# Patient Record
Sex: MALE | Race: OTHER | Employment: STUDENT | URBAN - METROPOLITAN AREA
[De-identification: names, ages, dates, MRNs, and addresses within clinical notes are randomized per-mention and may not be internally consistent; named-entity substitution may affect disease eponyms.]

---
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
## 2023-01-11 ENCOUNTER — OFFICE VISIT (OUTPATIENT)
Dept: FAMILY MEDICINE CLINIC | Facility: CLINIC | Age: 18
End: 2023-01-11

## 2023-01-11 VITALS
WEIGHT: 130 LBS | HEART RATE: 60 BPM | DIASTOLIC BLOOD PRESSURE: 67 MMHG | SYSTOLIC BLOOD PRESSURE: 121 MMHG | HEIGHT: 66.8 IN | BODY MASS INDEX: 20.4 KG/M2 | TEMPERATURE: 99 F | RESPIRATION RATE: 18 BRPM

## 2023-01-11 DIAGNOSIS — Z00.00 ROUTINE PHYSICAL EXAMINATION: Primary | ICD-10-CM

## 2023-01-11 DIAGNOSIS — M54.2 NECK PAIN: ICD-10-CM

## 2023-01-11 DIAGNOSIS — F34.1 DYSTHYMIA: ICD-10-CM

## 2023-01-11 PROCEDURE — 99385 PREV VISIT NEW AGE 18-39: CPT | Performed by: FAMILY MEDICINE

## 2023-01-11 PROCEDURE — 3074F SYST BP LT 130 MM HG: CPT | Performed by: FAMILY MEDICINE

## 2023-01-11 PROCEDURE — 3078F DIAST BP <80 MM HG: CPT | Performed by: FAMILY MEDICINE

## 2023-01-11 PROCEDURE — 99213 OFFICE O/P EST LOW 20 MIN: CPT | Performed by: FAMILY MEDICINE

## 2023-01-11 PROCEDURE — 3008F BODY MASS INDEX DOCD: CPT | Performed by: FAMILY MEDICINE

## 2023-01-11 NOTE — PROGRESS NOTES
Subjective:   Patient ID: John Carmona is a 21year old male. Patient is here for routine physical exam and to discuss mental health concerns. No acute issues. No significant chronic medical problems. Patient declined testing at this time. Did have some tests done last year fairly recently. . Diet and exercise have been fair. Past medical history, family history, and social history were reviewed. Pt has been feeling a little down. Lacking racquel and pleasure and lack of motivation. Has had this through high school. No sig family hx. Past hx of chlamydia and was treated. Pt also has hx of neck issues and ? Pinched nerve. Had CT done in ER and was told to follow up with PT. History/Other:   Review of Systems   Constitutional: Negative for fever. Respiratory: Negative for cough and shortness of breath. Cardiovascular: Negative for chest pain. Genitourinary: Negative for difficulty urinating and dysuria. Psychiatric/Behavioral: Negative for self-injury and suicidal ideas. Dysphoric mood: feeling some unhappy. No current outpatient medications on file. Allergies:No Known Allergies    Objective:   Physical Exam  Constitutional:       Appearance: Normal appearance. He is well-developed. HENT:      Head: Normocephalic. Right Ear: Tympanic membrane, ear canal and external ear normal.      Left Ear: Tympanic membrane, ear canal and external ear normal.      Nose: Nose normal.      Mouth/Throat:      Mouth: Mucous membranes are moist.      Pharynx: No oropharyngeal exudate or posterior oropharyngeal erythema. Eyes:      Conjunctiva/sclera: Conjunctivae normal.   Cardiovascular:      Rate and Rhythm: Normal rate and regular rhythm. Heart sounds: Normal heart sounds. Pulmonary:      Effort: Pulmonary effort is normal. No respiratory distress. Breath sounds: Normal breath sounds. No wheezing or rales. Abdominal:      General: Abdomen is flat. There is no distension. Palpations: Abdomen is soft. Tenderness: There is no abdominal tenderness. Musculoskeletal:         General: Normal range of motion. Cervical back: Normal range of motion and neck supple. No rigidity or tenderness. Skin:     General: Skin is warm. Neurological:      General: No focal deficit present. Mental Status: He is alert and oriented to person, place, and time. Sensory: No sensory deficit. Deep Tendon Reflexes: Reflexes are normal and symmetric. Psychiatric:         Mood and Affect: Mood normal.         Behavior: Behavior normal.         Assessment & Plan:   Routine physical examination:  - Exam is unremarkable. Blood tests were discussed and will await previous medical records. Encouraged healthy diet and activity. To call if problems or any significant symptoms. Routine follow up. Dysthymia:  - After discussion, will send for couseling for further evaluation and treatment; To call if any significant symptoms. Behavioral health navigator to help coordinate care. Information provided and referral generated. Neck pain:  - After discussion, will send to physiatry for further evaluation and treatment; To call if any significant symptoms. No orders of the defined types were placed in this encounter.       Meds This Visit:  Requested Prescriptions      No prescriptions requested or ordered in this encounter       Imaging & Referrals:  OP REFERRAL TO [facility]  PHYSIATRY - INTERNAL

## 2023-02-07 ENCOUNTER — OFFICE VISIT (OUTPATIENT)
Dept: PHYSICAL MEDICINE AND REHAB | Facility: CLINIC | Age: 18
End: 2023-02-07
Payer: MEDICAID

## 2023-02-07 VITALS
BODY MASS INDEX: 20.4 KG/M2 | DIASTOLIC BLOOD PRESSURE: 90 MMHG | HEIGHT: 66.8 IN | WEIGHT: 130 LBS | HEART RATE: 111 BPM | SYSTOLIC BLOOD PRESSURE: 132 MMHG | OXYGEN SATURATION: 98 %

## 2023-02-07 DIAGNOSIS — M54.2 NECK PAIN: ICD-10-CM

## 2023-02-07 DIAGNOSIS — M79.18 CERVICAL MYOFASCIAL PAIN SYNDROME: Primary | ICD-10-CM

## 2023-02-07 PROCEDURE — 3075F SYST BP GE 130 - 139MM HG: CPT | Performed by: PHYSICAL MEDICINE & REHABILITATION

## 2023-02-07 PROCEDURE — 3080F DIAST BP >= 90 MM HG: CPT | Performed by: PHYSICAL MEDICINE & REHABILITATION

## 2023-02-07 PROCEDURE — 99204 OFFICE O/P NEW MOD 45 MIN: CPT | Performed by: PHYSICAL MEDICINE & REHABILITATION

## 2023-02-07 PROCEDURE — 3008F BODY MASS INDEX DOCD: CPT | Performed by: PHYSICAL MEDICINE & REHABILITATION

## 2023-02-07 RX ORDER — MELOXICAM 15 MG/1
15 TABLET ORAL DAILY
Qty: 30 TABLET | Refills: 0 | Status: SHIPPED | OUTPATIENT
Start: 2023-02-07

## 2023-02-07 RX ORDER — METHYLPREDNISOLONE 4 MG/1
TABLET ORAL
Qty: 1 EACH | Refills: 0 | Status: SHIPPED | OUTPATIENT
Start: 2023-02-07

## 2023-02-07 NOTE — PATIENT INSTRUCTIONS
Start medrol dose pack, for the next 5-6 days, follow package instruction. Once complete, start Meloxicam (1 tab daily) for 2 weeks then as needed. Start physical therapy working on your neck muscles and neck range of motion. Follow up with me in about 6 weeks (or sooner if needed)   At that time if indicated we can discuss obtaining more advanced imaging of the neck if needed as well as escalation of your care in the form of muscle injections.

## 2023-03-07 ENCOUNTER — OFFICE VISIT (OUTPATIENT)
Dept: PHYSICAL THERAPY | Age: 18
End: 2023-03-07
Attending: PHYSICAL MEDICINE & REHABILITATION
Payer: MEDICAID

## 2023-03-07 DIAGNOSIS — M79.18 CERVICAL MYOFASCIAL PAIN SYNDROME: ICD-10-CM

## 2023-03-07 DIAGNOSIS — M54.2 NECK PAIN: ICD-10-CM

## 2023-03-07 PROCEDURE — 97140 MANUAL THERAPY 1/> REGIONS: CPT | Performed by: PHYSICAL THERAPIST

## 2023-03-07 PROCEDURE — 97161 PT EVAL LOW COMPLEX 20 MIN: CPT | Performed by: PHYSICAL THERAPIST

## 2023-03-07 PROCEDURE — 97110 THERAPEUTIC EXERCISES: CPT | Performed by: PHYSICAL THERAPIST

## 2023-03-09 ENCOUNTER — APPOINTMENT (OUTPATIENT)
Dept: PHYSICAL THERAPY | Age: 18
End: 2023-03-09
Attending: PHYSICAL MEDICINE & REHABILITATION
Payer: MEDICAID

## 2023-03-14 ENCOUNTER — APPOINTMENT (OUTPATIENT)
Dept: PHYSICAL THERAPY | Age: 18
End: 2023-03-14
Attending: PHYSICAL MEDICINE & REHABILITATION
Payer: MEDICAID

## 2023-03-17 ENCOUNTER — OFFICE VISIT (OUTPATIENT)
Dept: PHYSICAL THERAPY | Age: 18
End: 2023-03-17
Attending: PHYSICAL MEDICINE & REHABILITATION
Payer: MEDICAID

## 2023-03-17 PROCEDURE — 97110 THERAPEUTIC EXERCISES: CPT | Performed by: PHYSICAL THERAPIST

## 2023-03-17 PROCEDURE — 97140 MANUAL THERAPY 1/> REGIONS: CPT | Performed by: PHYSICAL THERAPIST

## 2023-03-17 NOTE — PROGRESS NOTES
Diagnosis:  Cervical myofascial pain syndrome (M79.18)  Neck pain (M54.2)          Next MD visit: none scheduled  Fall Risk: standard         Precautions: n/a          Medication Changes since last visit?: No      Subjective: Patient complains of neck and R shoulder pain. Pt describes pain level up to 6/10. Objective:  Cervical ROM is WFL into all planes. (+) R shoulder impingement sign  (-) tenderness to deep palpation over R shoulder  R shoulder ROM: flexion 0-160, abduction 0-145, external rotation 0-90, internal   R shoulder strength is grossly 4/5 into all planes. Assessment: Still with limited neck mobility. Reports increased R-sided neck pain with repeated motion. Presents with decreased R shoulder ROM due to pain. Patient and PT goals are in progress. Plan: Continue PT. Patient to follow up with MD due to continued neck pain and now R shoulder pain with loss of motion.      3/17/2023  Visit#: 2/8 [facility]   Thera Ex   x25min  - reviewed HEP  - neck retraction in slight flexion  - neck rotation to B sides  - neck sidebending unable due to pain  - instructed to do neck retraction in slight flexion   Manual PT   x15min  - cervical mobilization into rotation, lateral flexion with distraction  - cervical mobilization into extension                     Charges: EX2, Manual PT1       Total Timed Treatment: 40 min  Total Treatment Time: 40 min

## 2023-03-21 ENCOUNTER — APPOINTMENT (OUTPATIENT)
Dept: PHYSICAL THERAPY | Age: 18
End: 2023-03-21
Attending: PHYSICAL MEDICINE & REHABILITATION
Payer: MEDICAID

## 2023-03-21 ENCOUNTER — OFFICE VISIT (OUTPATIENT)
Dept: PHYSICAL MEDICINE AND REHAB | Facility: CLINIC | Age: 18
End: 2023-03-21
Payer: MEDICAID

## 2023-03-21 VITALS — BODY MASS INDEX: 22 KG/M2 | WEIGHT: 138 LBS | OXYGEN SATURATION: 100 % | HEART RATE: 78 BPM

## 2023-03-21 DIAGNOSIS — M79.18 CERVICAL MYOFASCIAL PAIN SYNDROME: Primary | ICD-10-CM

## 2023-03-21 DIAGNOSIS — M75.81 ROTATOR CUFF TENDINITIS, RIGHT: ICD-10-CM

## 2023-03-21 PROCEDURE — 99213 OFFICE O/P EST LOW 20 MIN: CPT | Performed by: PHYSICAL MEDICINE & REHABILITATION

## 2023-03-21 NOTE — PROGRESS NOTES
RETURN PATIENT VISIT    CHIEF COMPLAINT  Right-sided neck and right shoulder pain    INTERVAL HISTORY  Juliette Braswell is a 21year old who was last seen in clinic on 2/7/2023. Patient was started on anti-inflammatory burst and recommended to participate in physical therapy. Patient endorses significant improvement while on the steroid as well as about 30 to 40% improvement while in physical therapy. He rates his pain 3/10 he denies any radiation of his symptoms into his upper extremities. He denies any progressive weakness or progressive numbness. His pain is mostly located over the right trapezius as well as over the right anterior shoulder complex. His physical therapist was concerned about his right shoulder as a potential etiology of his pain complaints and was sent back to me for evaluation and clearance to continue participating in physical therapy. REVIEW OF SYSTEMS  Review of systems was completed with the patient today as pertinent to today's visit    PHYSICAL EXAMINATION  CONSTITUTIONAL: Well-appearing, in no apparent distress  EYES: No scleral icterus or conjunctival hemorrhage  CARDIOVASCULAR: Skin warm and well-perfused, no peripheral edema  RESPIRATORY: Breathing unlabored without accessory muscle use  PSYCHIATRIC: Alert, cooperative, appropriate mood and affect  SKIN: No lesions or rashes on exposed skin  MUSCULOSKELETAL: Patient has very mild tenderness over the anterior deltoid, he has a positive empty can on the right, positive belly press negative resisted internal and external rotation. Positive tenderness to palpation over the right trapezius middle and upper cervical paraspinals. He has a negative Kwon, negative scarf test, negative Isabella and speeds tests.   NEUROLOGIC: 5/5 strength in bilateral upper extremities sensation grossly intact light touch    REVIEW OF PRIOR X-RAYS/STUDIES  No pertinent imaging to review    IMPRESSION/DIAGNOSIS  Cervical myofascial pain syndrome (primary encounter diagnosis)  Rotator cuff tendinitis, right      TREATMENT/PLAN  Patient may have a contribution to his myofascial neck pain from his rotator cuff on the right side, I do not believe this patient has any intra-articular right shoulder pathology. May have some rotator cuff tendinitis. He will continue in physical therapy working on strengthening of his rotator cuff and stabilization of his right shoulder. If unsuccessful in alleviating the patient's pain complaints he may be a candidate for a right subdeltoid/subacromial corticosteroid injection in the future. He will follow-up with me after therapy. Education was provided regarding the above impression/diagnosis and treatment options/plan were discussed. All questions were answered during today's visit. Patient will contact clinic if any other questions or concerns.     Mauro Riedel DO  Physical Medicine and Rehabilitation / [address] [facility]

## 2023-03-21 NOTE — PATIENT INSTRUCTIONS
Continue in PT, working with new script which includes your right rotator cuff. Follow up with me after PT, if still lingering we can discuss injection into the subdeltoid bursa.

## 2023-03-28 ENCOUNTER — OFFICE VISIT (OUTPATIENT)
Dept: PHYSICAL THERAPY | Age: 18
End: 2023-03-28
Attending: PHYSICAL MEDICINE & REHABILITATION
Payer: MEDICAID

## 2023-03-28 PROCEDURE — 97110 THERAPEUTIC EXERCISES: CPT | Performed by: PHYSICAL THERAPIST

## 2023-03-28 NOTE — PROGRESS NOTES
Diagnosis:  Cervical myofascial pain syndrome (M79.18)  Neck pain (M54.2)          Next MD visit: none scheduled  Fall Risk: standard         Precautions: n/a          Medication Changes since last visit?: No      Subjective: Patient complains of neck and R shoulder pain. States it feels slightly better  Pt describes pain level up to 4/10. Objective:  Cervical ROM is WFL into all planes. (+) R shoulder impingement sign  (-) tenderness to deep palpation over R shoulder  R shoulder ROM: flexion 0-160, abduction 0-145, external rotation 0-90, internal rotation 0-80  R shoulder strength is grossly 4/5 into all planes. Assessment: Still with limited neck mobility. Presents with decreased R shoulder ROM due to pain. R shoulder strength also decreased and currently at 4/5 compared to L UE. Updated goals:    Updated Goals:  1. Patient will be independent with HEP, its progression, and management of residual symptoms. 2. Patient will report abolished peripheral symptoms and centralized neck pain of not more than 1/10 during activity. 3. Patient will verbalize and demonstrate strategies to improve posture during activity. 4. Patient will resume all previous activities including lifting and carrying without any major difficulty. 5. Patient will report R shoulder pain of not more than 1/10 during activities. 6. Increase R shoulder ROM and strength to WNL into all planes to improve UE mobility. 3/28/2023  Visit#: 3/8 [facility] 3/17/2023  Visit#: 2/8 [facility]   Thera Ex   x55min  - supine B shoulder flexion AROM with bora 10 x 2  - supine B shoulder horizontal abduction 10 x 2  - sidelying R shoulder external rotation with 1lb wt 10 x  2  - seated neck retraction, rotation to B sides 10 x 2  - freemotion shoulder rows 10 x 2  - instructed on HEP. Patient issued theraband for home use.  Patient stated no latex allergy, verbalized understanding of latex allergy precautions  - red theraband shoulder internal and external rotations 10 x 2  - B shoulder extension 10 x 2  - R shoulder behind the back stretch 10x  - UBE lvl 1, 3 min fwd / 3min bkw     x25min  - reviewed HEP  - neck retraction in slight flexion  - neck rotation to B sides  - neck sidebending unable due to pain  - instructed to do neck retraction in slight flexion   Manual PT    x15min  - cervical mobilization into rotation, lateral flexion with distraction  - cervical mobilization into extension     Neuromuscular re-education                Charges: EX2, Manual PT1       Total Timed Treatment: 40 min  Total Treatment Time: 40 min    Plan: Continue skilled Physical Therapy 1 -2 x/week or a total of 5 more visits over a 90 day period. Treatment will include: progression of rehab to improve neck and shoulder mobility. Patient/Family/Caregiver was advised of these findings, precautions, and treatment options and has agreed to actively participate in planning and for this course of care. Thank you for your referral. If you have any questions, please contact me at Dept: [phone].     Sincerely,  Electronically signed by therapist: Dimitris Huggins, PT

## 2023-04-05 ENCOUNTER — OFFICE VISIT (OUTPATIENT)
Dept: PHYSICAL THERAPY | Age: 18
End: 2023-04-05
Attending: PHYSICAL MEDICINE & REHABILITATION
Payer: MEDICAID

## 2023-04-05 PROCEDURE — 97110 THERAPEUTIC EXERCISES: CPT | Performed by: PHYSICAL THERAPIST

## 2023-04-05 NOTE — PROGRESS NOTES
Diagnosis:  Cervical myofascial pain syndrome (M79.18)  Neck pain (M54.2)          Next MD visit: none scheduled  Fall Risk: standard         Precautions: n/a          Medication Changes since last visit?: No      Subjective: Patient reports post exercise soreness after last therapy session. States today it feels a little better. States he feels exercises are making him worse. Pt describes pain level up to 3/10. Objective:  Cervical ROM is WFL into all planes. (+) R shoulder impingement sign  (-) tenderness to deep palpation over R shoulder  R shoulder ROM: flexion 0-160, abduction 0-145, external rotation 0-90, internal rotation 0-80  R shoulder strength is grossly 4/5 into all planes. Assessment: Still with limited neck and R UE mobility due to pain. Patient with decreased tolerance to therapeutic exercises and progression of exercises due to pain. Patient and PT goals are in progress. Goals:  1. Patient will be independent with HEP, its progression, and management of residual symptoms. 2. Patient will report abolished peripheral symptoms and centralized neck pain of not more than 1/10 during activity. 3. Patient will verbalize and demonstrate strategies to improve posture during activity. 4. Patient will resume all previous activities including lifting and carrying without any major difficulty. 5. Patient will report R shoulder pain of not more than 1/10 during activities. 6. Increase R shoulder ROM and strength to WNL into all planes to improve UE mobility.      4/5/2023  Visit#: 4/8 [facility] 3/28/2023  Visit#: 3/8 [facility] 3/17/2023  Visit#: 2/8 [facility]   Thera Ex   x40min  - B shoulder extension 10x  - B shoulder behind the back reach 10x  - supine B shoulder flexion AROM with bora 10x  - seated neck retraction  10x  - seated neck rotation 10x  - seated scapular squeeze 10x  - seated shoulder press 10x  - reviewed HEP   x55min  - supine B shoulder flexion AROM with bora 10 x 2  - supine B shoulder horizontal abduction 10 x 2  - sidelying R shoulder external rotation with 1lb wt 10 x  2  - seated neck retraction, rotation to B sides 10 x 2  - freemotion shoulder rows 10 x 2  - instructed on HEP. Patient issued theraband for home use. Patient stated no latex allergy, verbalized understanding of latex allergy precautions  - red theraband shoulder internal and external rotations 10 x 2  - B shoulder extension 10 x 2  - R shoulder behind the back stretch 10x  - UBE lvl 1, 3 min fwd / 3min bkw     x25min  - reviewed HEP  - neck retraction in slight flexion  - neck rotation to B sides  - neck sidebending unable due to pain  - instructed to do neck retraction in slight flexion   Manual PT     x15min  - cervical mobilization into rotation, lateral flexion with distraction  - cervical mobilization into extension     Neuromuscular re-education                  Charges: EX3     Total Timed Treatment: 40 min  Total Treatment Time: 40 min    Plan:Patient states he will call his MD office and say PT is not helping at this time. Will continue home program as tolerated. Patient will follow up and let us know if he will keep his other appointments. Patient/Family/Caregiver was advised of these findings, precautions, and treatment options and has agreed to actively participate in planning and for this course of care. Thank you for your referral. If you have any questions, please contact me at Dept: [phone].     Sincerely,  Electronically signed by therapist: Yecenia Peng., PT

## 2023-04-11 ENCOUNTER — APPOINTMENT (OUTPATIENT)
Dept: PHYSICAL THERAPY | Age: 18
End: 2023-04-11
Attending: PHYSICAL MEDICINE & REHABILITATION
Payer: MEDICAID

## 2023-04-18 ENCOUNTER — APPOINTMENT (OUTPATIENT)
Dept: PHYSICAL THERAPY | Age: 18
End: 2023-04-18
Attending: PHYSICAL MEDICINE & REHABILITATION
Payer: MEDICAID

## 2023-04-25 ENCOUNTER — APPOINTMENT (OUTPATIENT)
Dept: PHYSICAL THERAPY | Age: 18
End: 2023-04-25
Attending: PHYSICAL MEDICINE & REHABILITATION
Payer: MEDICAID

## 2023-05-18 ENCOUNTER — HOSPITAL ENCOUNTER (OUTPATIENT)
Age: 18
Discharge: HOME | End: 2023-05-18
Payer: MEDICAID

## 2023-05-18 VITALS
TEMPERATURE: 98 F | HEIGHT: 68 IN | SYSTOLIC BLOOD PRESSURE: 130 MMHG | WEIGHT: 135 LBS | HEART RATE: 56 BPM | DIASTOLIC BLOOD PRESSURE: 77 MMHG | OXYGEN SATURATION: 99 % | RESPIRATION RATE: 20 BRPM | BODY MASS INDEX: 20.46 KG/M2

## 2023-05-18 DIAGNOSIS — J02.9 ACUTE VIRAL PHARYNGITIS: Primary | ICD-10-CM

## 2023-05-18 DIAGNOSIS — Z20.822 ENCOUNTER FOR LABORATORY TESTING FOR COVID-19 VIRUS: ICD-10-CM

## 2023-05-18 LAB
S PYO AG THROAT QL: NEGATIVE
SARS-COV-2 RNA RESP QL NAA+PROBE: NOT DETECTED

## 2023-07-18 ENCOUNTER — OFFICE VISIT (OUTPATIENT)
Dept: FAMILY MEDICINE CLINIC | Facility: CLINIC | Age: 18
End: 2023-07-18

## 2023-07-18 VITALS
HEART RATE: 66 BPM | DIASTOLIC BLOOD PRESSURE: 60 MMHG | HEIGHT: 66.6 IN | BODY MASS INDEX: 23.67 KG/M2 | TEMPERATURE: 98 F | RESPIRATION RATE: 18 BRPM | SYSTOLIC BLOOD PRESSURE: 100 MMHG | WEIGHT: 149 LBS

## 2023-07-18 DIAGNOSIS — Z86.19 HISTORY OF ONYCHOMYCOSIS: ICD-10-CM

## 2023-07-18 DIAGNOSIS — L70.9 ACNE, UNSPECIFIED ACNE TYPE: ICD-10-CM

## 2023-07-18 DIAGNOSIS — M54.2 NECK PAIN: ICD-10-CM

## 2023-07-18 DIAGNOSIS — Z00.00 ROUTINE PHYSICAL EXAMINATION: Primary | ICD-10-CM

## 2023-07-18 PROCEDURE — 99214 OFFICE O/P EST MOD 30 MIN: CPT | Performed by: FAMILY MEDICINE

## 2023-07-18 PROCEDURE — 3008F BODY MASS INDEX DOCD: CPT | Performed by: FAMILY MEDICINE

## 2023-07-18 PROCEDURE — 3078F DIAST BP <80 MM HG: CPT | Performed by: FAMILY MEDICINE

## 2023-07-18 PROCEDURE — 3074F SYST BP LT 130 MM HG: CPT | Performed by: FAMILY MEDICINE

## 2023-07-18 NOTE — PROGRESS NOTES
Subjective:   Patient ID: Bridget Gilmore is a 21year old male. Pt presents with hx of some ? Toenail fungus. Has not tried anything for this yet. Pt also requesting referrals for physiatry for his neck for follow up. Pt has been doing PT and was getting better but then pain recurred. Also requesting routine blood work and referral for dermatologist for his acne. Diet has been good. History/Other:   Review of Systems   Constitutional:  Negative for fever. Musculoskeletal:  Positive for neck pain. Skin:  Color change: hx of acne. Current Outpatient Medications   Medication Sig Dispense Refill    methylPREDNISolone (MEDROL) 4 MG Oral Tablet Therapy Pack Take as directed (Patient not taking: Reported on 3/21/2023) 1 each 0    Meloxicam 15 MG Oral Tab Take 1 tablet (15 mg total) by mouth daily. (Patient not taking: Reported on 3/21/2023) 30 tablet 0     Allergies:No Known Allergies    Objective:   Physical Exam  Constitutional:       Appearance: Normal appearance. Cardiovascular:      Rate and Rhythm: Normal rate. Pulses: Normal pulses. Heart sounds: Normal heart sounds. Pulmonary:      Effort: Pulmonary effort is normal.      Breath sounds: Normal breath sounds. Musculoskeletal:      Comments: Some yellowing of tips of nails of both feet. Neurological:      Mental Status: He is alert. Psychiatric:         Mood and Affect: Mood normal.         Behavior: Behavior normal.         Assessment & Plan:   Routine physical examination:  - After discussion with patient, will check routine blood work as requested; To call if any significant symptoms. Follow up and further management after testing. Neck pain:  - After discussion, will send to physiatry for further evaluation and treatment; To call if any significant symptoms. History of onychomycosis/ Acne, unspecified acne type  - Discussed otc remedies for toenails.  After discussion, will send to dermatology for further evaluation and treatment for acne To call if any significant symptoms. Orders Placed This Encounter      Comp Metabolic Panel (14)      Lipid Panel      CBC, Platelet;  No Differential      Meds This Visit:  Requested Prescriptions      No prescriptions requested or ordered in this encounter       Imaging & Referrals:  [facility] - INTERNAL

## 2023-08-01 ENCOUNTER — OFFICE VISIT (OUTPATIENT)
Dept: DERMATOLOGY CLINIC | Facility: CLINIC | Age: 18
End: 2023-08-01

## 2023-08-01 DIAGNOSIS — L70.0 ACNE VULGARIS: Primary | ICD-10-CM

## 2023-08-01 PROCEDURE — 99203 OFFICE O/P NEW LOW 30 MIN: CPT | Performed by: PHYSICIAN ASSISTANT

## 2023-08-01 RX ORDER — TRETINOIN 0.5 MG/G
CREAM TOPICAL
Qty: 20 G | Refills: 5 | Status: SHIPPED | OUTPATIENT
Start: 2023-08-01

## 2023-08-01 RX ORDER — CLINDAMYCIN PHOSPHATE 10 UG/ML
1 LOTION TOPICAL DAILY
Qty: 60 ML | Refills: 3 | Status: SHIPPED | OUTPATIENT
Start: 2023-08-01

## 2023-08-01 NOTE — PATIENT INSTRUCTIONS
Morning Routine:    Cleanser  Clindamycin  Moisturizer  Sun screen    Evening Routine:    Cleanser  Moisturizer  Retinoid --> use a pea sized dot for the entire face 2-3 times per week. Can move slowly up to every night.    Moisturize

## 2023-08-01 NOTE — PROGRESS NOTES
HPI:    Patient ID: Olga Kelley is a 21year old male. Patient presents with acne on face and back for the past 5 years. Using OTC without relief. Patient denies personal or family hx of skin cancer. No allergies to medications noted. Sometimes will get pimples on his chest and back. No draining or tenderness noted. Review of Systems   Constitutional:  Negative for chills and fever. Musculoskeletal:  Negative for arthralgias and myalgias. Skin:  Positive for rash. Negative for color change and wound. Current Outpatient Medications   Medication Sig Dispense Refill    clindamycin 1 % External Lotion Apply 1 Application topically daily. 60 mL 3    Tretinoin 0.05 % External Cream Apply to face nightly as tolerated 20 g 5    methylPREDNISolone (MEDROL) 4 MG Oral Tablet Therapy Pack Take as directed 1 each 0    Meloxicam 15 MG Oral Tab Take 1 tablet (15 mg total) by mouth daily. 30 tablet 0     Allergies:No Known Allergies   There were no vitals taken for this visit. There is no height or weight on file to calculate BMI. PHYSICAL EXAM:   Physical Exam  Constitutional:       General: He is not in acute distress. Appearance: Normal appearance. Skin:     General: Skin is warm and dry. Findings: Rash present. Comments: Papules noted on the nose, cheeks and forehead. No draining or tenderness noted. No pustules noted. No cystic lesions noted. Neurological:      Mental Status: He is alert and oriented to person, place, and time. ASSESSMENT/PLAN:   1. Acne vulgaris  -After discussion with patient, advised the following:  -Started clindamycin   -Educated to apply daily.   -Started tretinoin as well  -Educated to apply 2-3 times per week at night only  -Can titrate up to nightly if not redness, irritation or dryness noted  -Shoulder moisturize daily  -Should cleanse daily and after sports.   -To return in 3 months for follow-up to monitor progress and toleration. -To call or follow-up with worsening symptoms or concerns.   -Pt was agreeable to plan and will comply with discussion above. No orders of the defined types were placed in this encounter. Meds This Visit:  Requested Prescriptions     Signed Prescriptions Disp Refills    clindamycin 1 % External Lotion 60 mL 3     Sig: Apply 1 Application topically daily.     Tretinoin 0.05 % External Cream 20 g 5     Sig: Apply to face nightly as tolerated       Imaging & Referrals:  None         OF#[id number]

## 2023-09-05 ENCOUNTER — OFFICE VISIT (OUTPATIENT)
Dept: PHYSICAL MEDICINE AND REHAB | Facility: CLINIC | Age: 18
End: 2023-09-05
Payer: MEDICAID

## 2023-09-05 VITALS — WEIGHT: 140 LBS | HEIGHT: 67 IN | BODY MASS INDEX: 21.97 KG/M2

## 2023-09-05 DIAGNOSIS — G89.29 CHRONIC RIGHT SHOULDER PAIN: Primary | ICD-10-CM

## 2023-09-05 DIAGNOSIS — S16.1XXD CERVICAL MYOFASCIAL STRAIN, SUBSEQUENT ENCOUNTER: ICD-10-CM

## 2023-09-05 DIAGNOSIS — M25.511 CHRONIC RIGHT SHOULDER PAIN: Primary | ICD-10-CM

## 2023-09-05 PROCEDURE — 3008F BODY MASS INDEX DOCD: CPT | Performed by: PHYSICAL MEDICINE & REHABILITATION

## 2023-09-05 PROCEDURE — 99214 OFFICE O/P EST MOD 30 MIN: CPT | Performed by: PHYSICAL MEDICINE & REHABILITATION

## 2023-09-05 RX ORDER — CYCLOBENZAPRINE HCL 10 MG
10 TABLET ORAL NIGHTLY
Qty: 30 TABLET | Refills: 0 | Status: SHIPPED | OUTPATIENT
Start: 2023-09-05

## 2023-09-05 RX ORDER — MELOXICAM 15 MG/1
15 TABLET ORAL DAILY
Qty: 30 TABLET | Refills: 0 | Status: SHIPPED | OUTPATIENT
Start: 2023-09-05

## 2023-10-13 RX ORDER — CLINDAMYCIN PHOSPHATE 10 UG/ML
1 LOTION TOPICAL DAILY
Qty: 60 ML | Refills: 3 | OUTPATIENT
Start: 2023-10-13

## 2023-12-23 ENCOUNTER — APPOINTMENT (OUTPATIENT)
Dept: CT IMAGING | Facility: HOSPITAL | Age: 18
End: 2023-12-23
Attending: EMERGENCY MEDICINE
Payer: MEDICAID

## 2023-12-23 PROCEDURE — 70450 CT HEAD/BRAIN W/O DYE: CPT | Performed by: EMERGENCY MEDICINE

## 2023-12-24 PROBLEM — F29 PSYCHOSIS (HCC): Status: ACTIVE | Noted: 2023-12-24

## 2024-02-08 ENCOUNTER — OFFICE VISIT (OUTPATIENT)
Dept: FAMILY MEDICINE CLINIC | Facility: CLINIC | Age: 19
End: 2024-02-08

## 2024-02-08 VITALS
SYSTOLIC BLOOD PRESSURE: 119 MMHG | HEART RATE: 70 BPM | BODY MASS INDEX: 22.87 KG/M2 | DIASTOLIC BLOOD PRESSURE: 69 MMHG | WEIGHT: 144 LBS | TEMPERATURE: 98 F | HEIGHT: 66.4 IN | RESPIRATION RATE: 18 BRPM

## 2024-02-08 DIAGNOSIS — Z00.00 ROUTINE PHYSICAL EXAMINATION: Primary | ICD-10-CM

## 2024-02-08 DIAGNOSIS — L70.9 ACNE, UNSPECIFIED ACNE TYPE: ICD-10-CM

## 2024-02-08 DIAGNOSIS — M54.2 NECK AND SHOULDER PAIN: ICD-10-CM

## 2024-02-08 DIAGNOSIS — M25.519 NECK AND SHOULDER PAIN: ICD-10-CM

## 2024-02-08 PROCEDURE — 99213 OFFICE O/P EST LOW 20 MIN: CPT | Performed by: FAMILY MEDICINE

## 2024-02-08 PROCEDURE — 99395 PREV VISIT EST AGE 18-39: CPT | Performed by: FAMILY MEDICINE

## 2024-02-08 RX ORDER — OLANZAPINE 5 MG/1
5 TABLET ORAL NIGHTLY
COMMUNITY
Start: 2024-01-11

## 2024-02-08 NOTE — PROGRESS NOTES
Subjective:   Patient ID: Aba Robles is a 18 year old male.    Patient is here for routine physical exam. No acute issues. No significant chronic medical problems. Patient is requesting testing. Diet and exercise have been fair. Not much exercise now.  Past medical history, family history, and social history were reviewed. Uncle had diabetes.    Past hx of mental health issues and is seeing a psychiatrist. Stable on medication.  Pt also has acne and requesting referral for dermatology.   Has had recurring issues with right neck and shoulder area. Was seeing Dr Kendrick and doing PT but still sig symptoms and needs referral for follow up.        History/Other:   Review of Systems   Constitutional: Negative.  Negative for fever.   HENT: Negative.     Eyes: Negative.    Respiratory: Negative.  Negative for cough and shortness of breath.    Cardiovascular: Negative.  Negative for chest pain.   Gastrointestinal: Negative.  Negative for abdominal pain.   Endocrine: Negative.    Genitourinary: Negative.  Negative for dysuria.   Musculoskeletal: Negative.    Skin: Negative.    Allergic/Immunologic: Negative.    Neurological: Negative.  Negative for dizziness and headaches.   Psychiatric/Behavioral: Negative.  Negative for dysphoric mood. The patient is not nervous/anxious.      Current Outpatient Medications   Medication Sig Dispense Refill    OLANZapine 5 MG Oral Tab Take 1 tablet (5 mg total) by mouth nightly.       Allergies:No Known Allergies    Objective:   Physical Exam  Constitutional:       Appearance: Normal appearance. He is well-developed.   HENT:      Head: Normocephalic.      Right Ear: Tympanic membrane, ear canal and external ear normal.      Left Ear: Tympanic membrane, ear canal and external ear normal.      Nose: Nose normal.      Mouth/Throat:      Mouth: Mucous membranes are moist.      Pharynx: No oropharyngeal exudate or posterior oropharyngeal erythema.   Eyes:      Conjunctiva/sclera: Conjunctivae  normal.   Cardiovascular:      Rate and Rhythm: Normal rate and regular rhythm.      Pulses: Normal pulses.      Heart sounds: Normal heart sounds.   Pulmonary:      Effort: Pulmonary effort is normal. No respiratory distress.      Breath sounds: Normal breath sounds. No wheezing or rales.   Abdominal:      General: Abdomen is flat. There is no distension.      Palpations: Abdomen is soft. There is no mass.      Tenderness: There is no abdominal tenderness.   Musculoskeletal:         General: Normal range of motion.      Cervical back: Normal range of motion and neck supple.      Comments: Discomfort over the posterior neck and superior shoulder of right side.   Skin:     General: Skin is warm.      Comments: Acne of face area   Neurological:      General: No focal deficit present.      Mental Status: He is alert and oriented to person, place, and time.      Sensory: No sensory deficit.      Deep Tendon Reflexes: Reflexes are normal and symmetric. Reflexes normal.   Psychiatric:         Mood and Affect: Mood normal.         Behavior: Behavior normal.         Assessment & Plan:   1. Routine physical examination:  - Exam is unremarkable. Screening tests were discussed, and after discussion, will check lab work as below. Healthy diet, exercise, and weight were discussed. To call if problems and follow up and further management after testing. Routine follow up.     2. Neck and shoulder pain:  - After discussion, will send to physiatry for further evaluation and treatment; To call if any significant symptoms.      3. Acne, unspecified acne type:  - After discussion, will send to dermatology for further evaluation and treatment; To call if any significant symptoms.          Orders Placed This Encounter   Procedures    Comp Metabolic Panel (14)    CBC, Platelet; No Differential    Lipid Panel       Meds This Visit:  Requested Prescriptions      No prescriptions requested or ordered in this encounter       Imaging &  Referrals:  PHYSIATRY - INTERNAL  DERM - INTERNAL

## 2024-04-01 ENCOUNTER — HOSPITAL ENCOUNTER (OUTPATIENT)
Dept: GENERAL RADIOLOGY | Age: 19
Discharge: HOME | End: 2024-04-01
Attending: PHYSICAL MEDICINE & REHABILITATION
Payer: MEDICAID

## 2024-04-01 DIAGNOSIS — G89.29 CHRONIC RIGHT SHOULDER PAIN: ICD-10-CM

## 2024-04-01 DIAGNOSIS — S16.1XXD CERVICAL MYOFASCIAL STRAIN, SUBSEQUENT ENCOUNTER: ICD-10-CM

## 2024-04-01 DIAGNOSIS — M25.511 CHRONIC RIGHT SHOULDER PAIN: ICD-10-CM

## 2024-04-01 PROCEDURE — 73030 X-RAY EXAM OF SHOULDER: CPT | Performed by: PHYSICAL MEDICINE & REHABILITATION

## 2024-04-02 ENCOUNTER — HOSPITAL ENCOUNTER (OUTPATIENT)
Dept: GENERAL RADIOLOGY | Age: 19
Discharge: HOME | End: 2024-04-02
Attending: PHYSICAL MEDICINE & REHABILITATION
Payer: MEDICAID

## 2024-04-02 DIAGNOSIS — S16.1XXD CERVICAL MYOFASCIAL STRAIN, SUBSEQUENT ENCOUNTER: ICD-10-CM

## 2024-04-02 DIAGNOSIS — G89.29 CHRONIC RIGHT SHOULDER PAIN: ICD-10-CM

## 2024-04-02 DIAGNOSIS — M25.511 CHRONIC RIGHT SHOULDER PAIN: ICD-10-CM

## 2024-04-02 PROCEDURE — 72050 X-RAY EXAM NECK SPINE 4/5VWS: CPT | Performed by: PHYSICAL MEDICINE & REHABILITATION

## 2024-04-25 ENCOUNTER — HOSPITAL ENCOUNTER (OUTPATIENT)
Age: 19
Discharge: HOME | End: 2024-04-25
Payer: MEDICAID

## 2024-04-25 VITALS
HEART RATE: 90 BPM | RESPIRATION RATE: 22 BRPM | TEMPERATURE: 98 F | BODY MASS INDEX: 25.11 KG/M2 | HEIGHT: 67 IN | DIASTOLIC BLOOD PRESSURE: 53 MMHG | OXYGEN SATURATION: 99 % | SYSTOLIC BLOOD PRESSURE: 143 MMHG | WEIGHT: 160 LBS

## 2024-04-25 DIAGNOSIS — K64.9 HEMORRHOIDS, UNSPECIFIED HEMORRHOID TYPE: Primary | ICD-10-CM

## 2024-04-25 PROCEDURE — 99213 OFFICE O/P EST LOW 20 MIN: CPT | Performed by: PHYSICIAN ASSISTANT

## 2024-04-25 RX ORDER — HYDROCORTISONE 25 MG/G
1 CREAM TOPICAL 2 TIMES DAILY
Qty: 28 G | Refills: 0 | Status: SHIPPED | OUTPATIENT
Start: 2024-04-25 | End: 2024-05-05

## 2024-04-25 NOTE — DISCHARGE INSTRUCTIONS
If your symptoms do not improve after using hydrocortisone cream, you should be evaluated by your primary care physician    If you develop abdominal pain, persistent vomiting or fever- be seen in the ER

## 2024-04-25 NOTE — ED PROVIDER NOTES
Patient Seen in: [facility]      History     Chief Complaint   Patient presents with    Rectal Bleeding     Stated Complaint: Abdominal Pain    Subjective:   HPI    19 yo male presenting with c/o rectal bleeding for the last 2-3 days.  Patient reports noting bright red blood with bowel movements.  There is minimal associated pain.  He denies constipation or straining.  He has not had hemorrhoids previously.  Denies abdominal pain, urinary symptoms.    Objective:   History reviewed. No pertinent past medical history.           History reviewed. No pertinent surgical history.             Social History     Socioeconomic History    Marital status: Single   Tobacco Use    Smoking status: Never     Passive exposure: Never   Substance and Sexual Activity    Alcohol use: Never    Drug use: Yes     Types: Cannabis   Other Topics Concern    Caffeine Concern No    Exercise No    Grew up on a farm No    History of tanning No    Outdoor occupation No    Reaction to local anesthetic No    Pt has a pacemaker No    Pt has a defibrillator No   Social History Narrative    The patient does not use an assistive device..      The patient does live in a home with stairs.              Review of Systems    Positive for stated complaint: Abdominal Pain  Other systems are as noted in HPI.  Constitutional and vital signs reviewed.      All other systems reviewed and negative except as noted above.    Physical Exam     ED Triage Vitals [04/25/24 1626]   /53   Pulse 90   Resp 22   Temp 98.3 °F (36.8 °C)   Temp src Temporal   SpO2 99 %   O2 Device None (Room air)       Current:/53   Pulse 90   Temp 98.3 °F (36.8 °C) (Temporal)   Resp 22   Ht 170.2 cm (5' 7\")   Wt 72.6 kg   SpO2 99%   BMI 25.06 kg/m²         Physical Exam  Vitals and nursing note reviewed.   Constitutional:       General: He is not in acute distress.  HENT:      Head: Normocephalic and atraumatic.      Right Ear: External ear normal.      Left  Ear: External ear normal.      Nose: Nose normal.      Mouth/Throat:      Mouth: Mucous membranes are moist.   Eyes:      Extraocular Movements: Extraocular movements intact.      Pupils: Pupils are equal, round, and reactive to light.   Cardiovascular:      Rate and Rhythm: Normal rate.   Pulmonary:      Effort: Pulmonary effort is normal.   Abdominal:      General: Abdomen is flat. There is no distension.      Tenderness: There is no abdominal tenderness.   Genitourinary:     Rectum: Internal hemorrhoid present. Normal anal tone.   Musculoskeletal:         General: Normal range of motion.      Cervical back: Normal range of motion.   Skin:     General: Skin is warm.   Neurological:      General: No focal deficit present.      Mental Status: He is alert and oriented to person, place, and time.   Psychiatric:         Mood and Affect: Mood normal.         Behavior: Behavior normal.               ED Course   Labs Reviewed - No data to display     18-year-old male presenting for evaluation of bright red blood per rectum for the last 2 to 3 days.  Patient noticing this only with bowel movements.  He is hemodynamically stable.  There is no abdominal distention or tenderness on exam.  Small internal hemorrhoid noted on ERLIN    Ddx-external hemorrhoid, anal fissure, internal hemorrhoid    Hydrocortisone Rx'd to pharmacy.  Discussed increasing water, fiber and OTC supplements to improve constipation.  PCP follow-up if the symptoms do not improve              MDM                                         Medical Decision Making      Disposition and Plan     Clinical Impression:  1. Hemorrhoids, unspecified hemorrhoid type         Disposition:  Discharge  4/25/2024  5:37 pm    Follow-up:  No follow-up provider specified.        Medications Prescribed:  Discharge Medication List as of 4/25/2024  5:38 PM        START taking these medications    Details   hydrocortisone 2.5 % External Cream Place 1 Application rectally 2 (two)  times daily for 10 days., Normal, Disp-28 g, R-0

## 2024-04-25 NOTE — ED INITIAL ASSESSMENT (HPI)
Pt presents to the IC with c/o rectal bleed with pattie blood for the last 2-3 days, coupled with midline abd pain. Pt denies hx of hemorrhoids. No fevers. No n/v.

## 2024-05-07 ENCOUNTER — OFFICE VISIT (OUTPATIENT)
Dept: PHYSICAL MEDICINE AND REHAB | Facility: CLINIC | Age: 19
End: 2024-05-07
Payer: MEDICAID

## 2024-05-07 VITALS — RESPIRATION RATE: 18 BRPM | BODY MASS INDEX: 25.11 KG/M2 | WEIGHT: 160 LBS | HEIGHT: 67 IN

## 2024-05-07 DIAGNOSIS — M25.511 CHRONIC RIGHT SHOULDER PAIN: Primary | ICD-10-CM

## 2024-05-07 DIAGNOSIS — G89.29 CHRONIC RIGHT SHOULDER PAIN: Primary | ICD-10-CM

## 2024-05-07 PROCEDURE — 99214 OFFICE O/P EST MOD 30 MIN: CPT | Performed by: PHYSICAL MEDICINE & REHABILITATION

## 2024-05-07 NOTE — PROGRESS NOTES
RETURN PATIENT VISIT    CHIEF COMPLAINT  Neck pain    INTERVAL HISTORY  Aba Robles is a 18 year old who was last seen in clinic on 9/5/2023, in follow-up for neck and shoulder pain on the right side.  Patient completed an x-ray recently which is reviewed in full below.  Pain is located primarily in the right side of the neck to the top of the right shoulder.  Denies radiation of symptoms or numbness and tingling.  States his pain varies in intensity depending on his activities.  He has mild pain with range of motion.  Denies current use of pain medications or current physical therapy.  He completed physical therapy previously.    Additionally he states that he previously had an MRI of his right shoulder a number of years ago at [facility] which she plans to obtain for my review.    REVIEW OF SYSTEMS  Review of systems was completed with the patient today as pertinent to today's visit    PHYSICAL EXAMINATION  CONSTITUTIONAL: Well-appearing, in no apparent distress  EYES: No scleral icterus or conjunctival hemorrhage  CARDIOVASCULAR: Skin warm and well-perfused, no peripheral edema  RESPIRATORY: Breathing unlabored without accessory muscle use  PSYCHIATRIC: Alert, cooperative, appropriate mood and affect  SKIN: No lesions or rashes on exposed skin  MUSCULOSKELETAL: Patient has tenderness palpation of the musculature in the posterior shoulder as well as through the infraspinatus and its tendon.  He has pain with abduction as well as Kwon maneuver on the right side compared to the left.  Full passive range of motion however.  NEUROLOGIC: Well-maintained strength and sensation in the upper extremities.    REVIEW OF PRIOR X-RAYS/STUDIES  Independently reviewed the plain from radiographs of the cervical spine dated 4/3/2024 which reveals unremarkable osseous structures, slightly limited range of motion in flexion and extension without abnormal subluxation.    Additionally reviewed the plain from radiographs  of the right shoulder dated 4/1/2024 which reveals normal right shoulder osseous structures.  No soft tissue swelling or abnormal alignment.    IMPRESSION/DIAGNOSIS  1.    Encounter Diagnosis   Name Primary?    Chronic right shoulder pain Yes         TREATMENT/PLAN  Patient has failed physical therapy, prescription anti-inflammatory medications and home exercise program.  My suspicion is that his pain complaints are largely muscular as his radiographs have been unrevealing for osseous abnormality.  I would like to order an ultrasound of the right shoulder to check for any soft tissue abnormalities leading to his pain complaints, if unrevealing patient will likely need gentle return to gym exercises, rotator cuff strengthening and a solid home routine.  He will follow-up with me after ultrasound is completed.    Education was provided regarding the above impression/diagnosis and treatment options/plan were discussed.  All questions were answered during today's visit.  Patient will contact clinic if any other questions or concerns.          Stephane Kendrick DO  Interventional Spine and Sports Medicine Specialist   Physical Medicine and Rehabilitation  [address] [address]    [address] [place] [address]

## 2024-07-09 PROCEDURE — 12011 RPR F/E/E/N/L/M 2.5 CM/<: CPT

## 2024-07-09 PROCEDURE — 99284 EMERGENCY DEPT VISIT MOD MDM: CPT

## 2024-07-10 ENCOUNTER — HOSPITAL ENCOUNTER (EMERGENCY)
Facility: HOSPITAL | Age: 19
Discharge: HOME | End: 2024-07-10
Attending: EMERGENCY MEDICINE
Payer: MEDICAID

## 2024-07-10 ENCOUNTER — APPOINTMENT (OUTPATIENT)
Dept: CT IMAGING | Facility: HOSPITAL | Age: 19
End: 2024-07-10
Attending: EMERGENCY MEDICINE
Payer: MEDICAID

## 2024-07-10 VITALS
HEART RATE: 90 BPM | SYSTOLIC BLOOD PRESSURE: 122 MMHG | HEIGHT: 67 IN | OXYGEN SATURATION: 100 % | TEMPERATURE: 99 F | DIASTOLIC BLOOD PRESSURE: 80 MMHG | BODY MASS INDEX: 24.33 KG/M2 | RESPIRATION RATE: 18 BRPM | WEIGHT: 155 LBS

## 2024-07-10 DIAGNOSIS — S01.111A LACERATION OF RIGHT EYEBROW, INITIAL ENCOUNTER: Primary | ICD-10-CM

## 2024-07-10 DIAGNOSIS — S09.90XA INJURY OF HEAD, INITIAL ENCOUNTER: ICD-10-CM
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
## 2024-07-10 PROCEDURE — 70450 CT HEAD/BRAIN W/O DYE: CPT | Performed by: EMERGENCY MEDICINE

## 2024-07-10 RX ORDER — KETOROLAC TROMETHAMINE 10 MG/1
10 TABLET, FILM COATED ORAL EVERY 6 HOURS PRN
Qty: 20 TABLET | Refills: 0 | Status: SHIPPED | OUTPATIENT
Start: 2024-07-10 | End: 2024-07-15

## 2024-07-10 RX ORDER — LIDOCAINE HYDROCHLORIDE 10 MG/ML
20 INJECTION, SOLUTION EPIDURAL; INFILTRATION; INTRACAUDAL; PERINEURAL ONCE
Status: COMPLETED | OUTPATIENT
Start: 2024-07-10 | End: 2024-07-10

## 2024-07-10 RX ORDER — HYDROCODONE BITARTRATE AND ACETAMINOPHEN 5; 325 MG/1; MG/1
2 TABLET ORAL ONCE
Status: COMPLETED | OUTPATIENT
Start: 2024-07-10 | End: 2024-07-10
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
## 2024-07-10 NOTE — DISCHARGE INSTRUCTIONS
Keep wound clean with soapy water twice daily and apply antibiotic ointment such as Neosporin 3 times daily.  Follow-up in 7 days for suture removal.
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
## 2024-07-17 ENCOUNTER — HOSPITAL ENCOUNTER (OUTPATIENT)
Age: 19
Discharge: HOME | End: 2024-07-17
Payer: MEDICAID

## 2024-07-17 VITALS
SYSTOLIC BLOOD PRESSURE: 123 MMHG | TEMPERATURE: 99 F | RESPIRATION RATE: 16 BRPM | OXYGEN SATURATION: 98 % | HEART RATE: 59 BPM | DIASTOLIC BLOOD PRESSURE: 69 MMHG

## 2024-07-17 DIAGNOSIS — Z48.02 ENCOUNTER FOR REMOVAL OF SUTURES: Primary | ICD-10-CM

## 2024-07-17 PROCEDURE — 90715 TDAP VACCINE 7 YRS/> IM: CPT | Performed by: NURSE PRACTITIONER

## 2024-07-17 PROCEDURE — 99024 POSTOP FOLLOW-UP VISIT: CPT | Performed by: NURSE PRACTITIONER

## 2024-07-17 PROCEDURE — 90471 IMMUNIZATION ADMIN: CPT | Performed by: NURSE PRACTITIONER
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
## 2024-07-17 NOTE — DISCHARGE INSTRUCTIONS
Wound is healing well.  Continue to clean with soap and water.  He may continue the topical antibiotic ointment.  The bruising around your eye and the redness to your eye should continue to improve.  Your tetanus was updated today.  Schedule follow-up with your primary doctor as needed

## 2024-08-12 ENCOUNTER — OFFICE VISIT (OUTPATIENT)
Dept: DERMATOLOGY CLINIC | Facility: CLINIC | Age: 19
End: 2024-08-12
Payer: MEDICAID

## 2024-08-12 DIAGNOSIS — L70.0 ACNE VULGARIS: Primary | ICD-10-CM

## 2024-08-12 PROCEDURE — 99213 OFFICE O/P EST LOW 20 MIN: CPT | Performed by: PHYSICIAN ASSISTANT

## 2024-08-12 RX ORDER — TRETINOIN 0.5 MG/G
CREAM TOPICAL
Qty: 20 G | Refills: 5 | Status: SHIPPED | OUTPATIENT
Start: 2024-08-12

## 2024-08-12 RX ORDER — CLINDAMYCIN PHOSPHATE 10 UG/ML
1 LOTION TOPICAL DAILY
Qty: 60 ML | Refills: 3 | Status: SHIPPED | OUTPATIENT
Start: 2024-08-12

## 2024-08-12 NOTE — PROGRESS NOTES
HPI:    Patient ID: Aba Robles is a 18 year old male.    Patient presents for a follow up to acne on the face and body. Patient used clindamycin lotion and tretinoin x several months with improvement but has been off of the treatments bc he ran out of refills. Patient would like to discuss accutane. No draining or tenderness noted. No allergies to medications noted.         Review of Systems   Constitutional:  Negative for chills and fever.   Musculoskeletal:  Negative for arthralgias and myalgias.   Skin:  Positive for rash. Negative for color change and wound.            Current Outpatient Medications   Medication Sig Dispense Refill    OLANZapine 5 MG Oral Tab Take 1 tablet (5 mg total) by mouth nightly. (Patient not taking: Reported on 8/12/2024)       Allergies:No Known Allergies   There were no vitals taken for this visit.  There is no height or weight on file to calculate BMI.  PHYSICAL EXAM:   Physical Exam  Constitutional:       General: He is not in acute distress.     Appearance: Normal appearance.   Skin:     General: Skin is warm and dry.      Findings: Rash present.      Comments: Papular lesions noted on the face and back. No pustules noted. Smaller cystic lesions note don the face. Some scarring as well.    Neurological:      Mental Status: He is alert and oriented to person, place, and time.                ASSESSMENT/PLAN:   1. Acne vulgaris  -After discussion with patient, advised the following:  -Continue Clindamycin   -Educated to apply daily in the morning.   -Continue tretinoin as well  -Educated to apply 2-3 times per week at night only  -Can titrate up to nightly if not redness, irritation or dryness noted  -Shoulder moisturize daily  -Should cleanse daily and after sports.   -To see dermatologist for accutane.   -To call or follow-up with worsening symptoms or concerns.   -Pt was agreeable to plan and will comply with discussion above.         No orders of the defined types were placed  in this encounter.      Meds This Visit:  Requested Prescriptions      No prescriptions requested or ordered in this encounter       Imaging & Referrals:  None         ID#[id number]

## 2024-09-05 ENCOUNTER — OFFICE VISIT (OUTPATIENT)
Dept: DERMATOLOGY CLINIC | Facility: CLINIC | Age: 19
End: 2024-09-05

## 2024-09-05 ENCOUNTER — TELEPHONE (OUTPATIENT)
Dept: DERMATOLOGY CLINIC | Facility: CLINIC | Age: 19
End: 2024-09-05

## 2024-09-05 DIAGNOSIS — L70.0 ACNE VULGARIS: Primary | ICD-10-CM

## 2024-09-05 PROCEDURE — 99214 OFFICE O/P EST MOD 30 MIN: CPT | Performed by: STUDENT IN AN ORGANIZED HEALTH CARE EDUCATION/TRAINING PROGRAM

## 2024-09-05 NOTE — PROGRESS NOTES
September 5, 2024    Established patient     Referred by:   No referring provider defined for this encounter.     CHIEF COMPLAINT: Acne Concern    HISTORY OF PRESENT ILLNESS: .    1. Acne   Location: Face, Back    Duration: 6 years   Signs and symptoms: Papular lesions without improvement- patient is currently flaring   Current treatment: Clindamycin AM, tretinoin PM x 1 year   Past treatments: None    DERM HISTORY:  History of chronic skin disease/condition: Yes    History/Other:    REVIEW OF SYSTEMS:  Constitutional: Denies fever, chills, unintentional weight loss.   Skin as per HPI    PAST MEDICAL HISTORY:  No past medical history on file.    Medications  Current Outpatient Medications   Medication Sig Dispense Refill    clindamycin 1 % External Lotion Apply 1 Application topically daily. 60 mL 3    Tretinoin 0.05 % External Cream Apply to face nightly as tolerated 20 g 5    OLANZapine 5 MG Oral Tab Take 1 tablet (5 mg total) by mouth nightly. (Patient not taking: Reported on 8/12/2024)         Objective:    PHYSICAL EXAM:  General: awake, alert, no acute distress  Skin: Skin exam was performed today including the following: face. Pertinent findings include:   - with numerous nodules and cysts    ASSESSMENT & PLAN:  Pathophysiology of diagnoses discussed with patient.  Therapeutic options reviewed. Risks, benefits, and alternatives discussed with patient. Instructions reviewed at length.    #Acne Vulgaris, nodulocystic  - Given severity of acne discussed need for systemic treatment. Reviewed treatment options in detail including antibiotics vs isotretinoin. Discussed that without systemic therapy, risks include irreversible scarring of affected areas.   - Pending blood work, will start isotretinoin 60 mg once daily with fatty meal   - Goal dose 200mg/kg = 68307 mg    - Discussed patient cannot donate blood while on the medication and for 1 month after stopping. Discussed that patient cannot share medication.  -  Labs today  AST, ALT, fasting lipid panel. If labs within acceptable limits, script for isotretinoin to be sent to pharmacy and patient to be confirmed in iPledge.    I discussed at length the issues of isotretnoin therapy including goal of treatment, medication dosage, duration of therapy, and side effects, as well as the alternative to care.     Patient denies personal/family hx of IBD  Patient denies personal/family hx of depression/suicide    Reviewed adverse effects including those that are common and not serious, such as dry skin (xerosis) with/without retinoid dermatitis, dry lips (cheilitis), dry nose (xeromycteria) with/without epistaxis, dry eyes (xerophthalmia), irritation of contact lenses, dyslipidemia (increase in cholesterol and triglycerides), phototoxicity, possible exacerbation/flaring of acne vulgaris in the first 4 to 6 weeks of therapy, and arthralgias/myalgias (muscle aches and joint pains), as well as those that are rare, but serious, such as pseudotumor cerebri, major depressive disorder, suicidal ideation, hepatotoxicity, pancreatitis, teratogenicity (females), anemia/leukopenia, changes to night vision, and possible unmasking or exacerbation of inflammatory bowel disease.    The patient understands there is a possibility of acne recurrence; however, acne is often easier to treat if it recurs after isotretinoin therapy. Patient agrees to not give blood during treatment and for 1 month after treatment. Patient agrees not to share medication. If there are any adverse events/symptoms including depression, thoughts of suicide, diarrhea, abdominal pain or cramping, blood in stool, or other concerning side effects, patient will contact us or head directly to ED for immediate evaluation. Patient informed to stop all other acne medications while on isotretinoin. Informed patient they can take Ibuprofen or NSAIDs for joint aches. Instructed to avoid Tylenol and alcohol due to increased risk of liver  toxicity while on isotretinoin. Questions were answered, Patient expressed understanding of the risks, benefits, alternatives and guidelines and would like to proceed with isotretinoin therapy. Counseling and consents signed today and iPledge booklet given to patient.    Return to clinic: 1 month     Ramiro Escobedo MD

## 2024-09-05 NOTE — TELEPHONE ENCOUNTER
Patient Name: Aba Robles   Street Address: [address] [place]  State: [place]  Zip Code: [address]   Telephone #: [phone]  E-Mail Address: [email or web address]  Preferred Method of Contact (e-mail or text): Text     Date Consent Signed: 09/05/2024    *Patient enrolled into iPLEDGE- pending prescribed action

## 2024-09-07 ENCOUNTER — LAB ENCOUNTER (OUTPATIENT)
Dept: LAB | Age: 19
End: 2024-09-07
Attending: STUDENT IN AN ORGANIZED HEALTH CARE EDUCATION/TRAINING PROGRAM
Payer: MEDICAID

## 2024-09-07 DIAGNOSIS — L70.0 ACNE VULGARIS: ICD-10-CM

## 2024-09-07 LAB
ALT SERPL-CCNC: 11 U/L
AST SERPL-CCNC: 18 U/L (ref ?–34)
TRIGL SERPL-MCNC: 48 MG/DL (ref 30–149)

## 2024-09-07 PROCEDURE — 84450 TRANSFERASE (AST) (SGOT): CPT

## 2024-09-07 PROCEDURE — 84460 ALANINE AMINO (ALT) (SGPT): CPT

## 2024-09-07 PROCEDURE — 36415 COLL VENOUS BLD VENIPUNCTURE: CPT

## 2024-09-07 PROCEDURE — 84478 ASSAY OF TRIGLYCERIDES: CPT

## 2024-09-09 RX ORDER — ISOTRETINOIN 30 MG/1
60 CAPSULE ORAL DAILY
Qty: 60 CAPSULE | Refills: 0 | OUTPATIENT
Start: 2024-09-09

## 2024-09-09 RX ORDER — ISOTRETINOIN 30 MG/1
60 CAPSULE ORAL DAILY
Qty: 60 CAPSULE | Refills: 0 | Status: SHIPPED | OUTPATIENT
Start: 2024-09-09

## 2024-09-09 NOTE — PROGRESS NOTES
Dr. Escobedo,    Pt can't be managed. Ipledge status shows below message:    \"Pending Prescriber Action\"

## 2024-09-09 NOTE — TELEPHONE ENCOUNTER
Dr. Escobedo- labs resulted, please send RX        Component  Ref Range & Units 9/7/24 12:42 PM   Triglycerides  30 - 149 mg/dL 48           Component  Ref Range & Units 9/7/24 12:42 PM   ALT  10 - 49 U/L 11              Component  Ref Range & Units 9/7/24 12:42 PM   AST  <34 U/L 18

## 2024-10-01 ENCOUNTER — OFFICE VISIT (OUTPATIENT)
Dept: PHYSICAL MEDICINE AND REHAB | Facility: CLINIC | Age: 19
End: 2024-10-01
Payer: MEDICAID

## 2024-10-01 VITALS — WEIGHT: 156 LBS | BODY MASS INDEX: 24.48 KG/M2 | HEIGHT: 67 IN

## 2024-10-01 DIAGNOSIS — G89.29 CHRONIC RIGHT SHOULDER PAIN: Primary | ICD-10-CM

## 2024-10-01 DIAGNOSIS — M25.511 CHRONIC RIGHT SHOULDER PAIN: Primary | ICD-10-CM

## 2024-10-01 PROCEDURE — 99214 OFFICE O/P EST MOD 30 MIN: CPT | Performed by: PHYSICAL MEDICINE & REHABILITATION

## 2024-10-01 NOTE — PROGRESS NOTES
RETURN PATIENT VISIT    CHIEF COMPLAINT  Right shoulder pain    INTERVAL HISTORY  Aba Robles is a 18 year old who was last seen in clinic on 5/7/2020 for following up on anterior right shoulder pain.  Denies numbness and tingling.  He endorses some weakness.  Currently rates his level pain 8/10.  Not currently using medication.  He dissipated last physical therapy around 2 years ago.  He was unable to get the ultrasound completed.  He does have an x-ray as well as an old MRI for my review    Of his right shoulder.      Chief Complaint   Patient presents with    Follow - Up     LOV: 5/7/2024 pt comes in with anterior R shoulder pain. Denies T/N. Admits some weakness. Rates pain 8/10. Denies medication. Denies US.          REVIEW OF SYSTEMS  Review of systems was completed with the patient today as pertinent to today's visit    PHYSICAL EXAMINATION  CONSTITUTIONAL: Well-appearing, in no apparent distress  EYES: No scleral icterus or conjunctival hemorrhage  CARDIOVASCULAR: Skin warm and well-perfused, no peripheral edema  RESPIRATORY: Breathing unlabored without accessory muscle use  PSYCHIATRIC: Alert, cooperative, appropriate mood and affect  SKIN: No lesions or rashes on exposed skin  MUSCULOSKELETAL: He has tenderness over the anterior capsule on the right side near the biceps tendon.  Speed's Test is negative.  Cozen's test is negative.  Empty can test is negative.  Resisted external rotation is mildly uncomfortable for the patient.  He does have some crepitus with Kwon maneuver as well as scarf.  NEUROLOGIC: Strength is well-maintained in the upper extremities.    REVIEW OF PRIOR X-RAYS/STUDIES  Independently reviewed prior MRI of the right shoulder which was unrevealing for osseous abnormality.    Prior x-rays from 4/1/2024 revealed normal shoulder radiographs without abnormality in the osseous structures.    IMPRESSION/DIAGNOSIS  1.    Encounter Diagnosis   Name Primary?    Chronic right shoulder pain  Yes         TREATMENT/PLAN  Unclear etiology at this point but suspect primarily myofascial, possibly from the biceps tendon, certainly would benefit from seeing the ultrasound of the right shoulder, I have encouraged him to schedule this.  If there is a soft tissue abnormality appearing on the ultrasound we may have an interventional target, versus if there is fluid in the joint or some degeneration of the AC joint, may be able to intervene however at this time we will get him started back in physical therapy working on shoulder stabilization as well as some myofascial treatments to the area.    He will follow-up with me after the ultrasound is completed.    Education was provided regarding the above impression/diagnosis and treatment options/plan were discussed.  All questions were answered during today's visit.  Patient will contact clinic if any other questions or concerns.          Stephane Kendrick,   Interventional Spine and Sports Medicine Specialist   [facility]  [address] [address]    [address] [place] [address]

## 2024-10-05 RX ORDER — TRETINOIN 0.5 MG/G
CREAM TOPICAL
Qty: 20 G | Refills: 5 | Status: CANCELLED | OUTPATIENT
Start: 2024-10-05

## 2024-10-05 RX ORDER — CLINDAMYCIN PHOSPHATE 10 UG/ML
1 LOTION TOPICAL DAILY
Qty: 60 ML | Refills: 3 | Status: CANCELLED | OUTPATIENT
Start: 2024-10-05

## 2024-10-07 NOTE — TELEPHONE ENCOUNTER
Refill Request for medication(s):     Last Office Visit:  09/05/2024    Last Refill:    Pharmacy, Dosage verified:     Condition Update (if applicable):     Rx pended and sent to provider for approval, please advise. Thank You!

## 2024-11-01 ENCOUNTER — OFFICE VISIT (OUTPATIENT)
Dept: PHYSICAL THERAPY | Age: 19
End: 2024-11-01
Attending: PHYSICAL MEDICINE & REHABILITATION
Payer: MEDICAID

## 2024-11-01 DIAGNOSIS — G89.29 CHRONIC RIGHT SHOULDER PAIN: Primary | ICD-10-CM

## 2024-11-01 DIAGNOSIS — M25.511 CHRONIC RIGHT SHOULDER PAIN: Primary | ICD-10-CM

## 2024-11-01 PROCEDURE — 97110 THERAPEUTIC EXERCISES: CPT

## 2024-11-01 PROCEDURE — 97530 THERAPEUTIC ACTIVITIES: CPT

## 2024-11-01 PROCEDURE — 97161 PT EVAL LOW COMPLEX 20 MIN: CPT

## 2024-11-01 NOTE — PROGRESS NOTES
SHOULDER EVALUATION:     Diagnosis:   Right shoulder pain      Referring Provider: Stephane Kendrick  Date of Evaluation:    11/1/2024    Precautions:  None Next MD visit:   none scheduled  Date of Surgery: n/a     PATIENT SUMMARY   Aba Robles is a 18 year old male who presents to therapy today with complaints of right shoulder pain for the past 3 years. He thinks it might be from working out.  Pt describes pain level current 6/10, at best 4/10, at worst 8/10.   Current functional limitations include working as a .     Aba describes prior level of function was independent with his ADLs and IADLs. Pt goals include decreasing his pain.  Past medical history was reviewed with Aba. No significant findings with x-rays and mri.    ASSESSMENT  Aba presents to physical therapy evaluation with primary c/o right shoulder pain. The results of the objective tests and measures show decreased rom, weakness and poor posture.  Functional deficits include but are not limited to working.  Signs and symptoms are consistent with diagnosis of right shoulder pain. Pt and PT discussed evaluation findings, pathology, POC and HEP.  Pt voiced understanding and performs HEP correctly without reported pain. Skilled Physical Therapy is medically necessary to address the above impairments and reach functional goals.     OBJECTIVE:   Observation/Posture: Depressed right shoulder and right rounded shoulder posture.  Palpation: Right shoulder pain at the rtc insertion region.  Sensation: Intact tyler.  Cervical Screen: 50% decrease in all directions except ext. At 25% deficit.    AROM: (* denotes performed with pain)  Shoulder    Flexion: R 180; L 180  Abduction: R 180; L 180  ER: R 90; L 90  IR: R 69; L 90     Accessory motion: Right shoulder hypomobility in all directions; +2 grades.    Flexibility: Right shoulder tightness.    Strength/MMT: (* denotes performed with pain)  Shoulder Elbow Scapular   Flexion: R 4/5; L  4+/5  Abduction: R 4/5; L 4+/5  ER: R 4/5; L 4+/5  IR: R 4/5; L 4+/5 Flexion: R 4/5; L 4+/5  Extension: R 4/5; L 4+/5   Rhomboids: R4/5, L 4+/5  Mid trap: R 4/5; L 4+/5   Lats: R 4/5, L 4+/5  Low trap: R 4/5; L 4+/5     Special tests:   Positive right shoulder kennedy-chay test. Negative for all other tests.    Today’s Treatment and Response:   Pt education was provided on exam findings, treatment diagnosis, treatment plan, expectations, and prognosis. Pt was also provided recommendations for activity modifications, postural corrections, and ergonomics.  Patient was instructed in and issued a HEP for: Ther. Ex. For 10'- Scap. Ret. 20x3\"          Cane IR St. 20x5\"          Doorway St. 4x30\"          Ther. Act. For 10'- Discussed his injury, pt expectations and prognosis.    Charges: 1PT Eval Low Complexity, 1TE and 1TA      Total Timed Treatment: 20 min     Total Treatment Time: 45 min     Based on clinical rationale and outcome measures, this evaluation involved Low Complexity decision making.  PLAN OF CARE:    Goals: (to be met in 10 visits)   .Pt will report improved ability to sleep without waking due to shoulder pain   Pt will increase shoulder AROM IR to scratch his back at the scapular level to be able to reach in back pocket, tuck in shirt, and turn steering wheel without pain  Pt will improve shoulder strength throughout to 5/5 to improve function with working   Pt will demonstrate increased mid/low trap strength to 5/5 to promote improved shoulder mechanics and stabilization with lifting and reaching   Pt will be independent and compliant with comprehensive HEP to maintain progress achieved in PT                Frequency / Duration: Patient will be seen for 2 x/week or a total of 10 visits over a 90 day period. Treatment will include: Manual Therapy, Neuromuscular Re-education, Self-Care Home Management, Therapeutic Activities, Therapeutic Exercise, Home Exercise Program instruction, and Modalities to  include: Electrical stimulation (unattended)    Education or treatment limitation: None  Rehab Potential:fair    QuickDASH Outcome Score  Score: (Patient-Rptd) 47.73 % (11/1/2024  8:37 AM)      Patient/Family/Caregiver was advised of these findings, precautions, and treatment options and has agreed to actively participate in planning and for this course of care.    Thank you for your referral. Please co-sign or sign and return this letter via fax as soon as possible to [phone]. If you have any questions, please contact me at Dept: [phone]    Sincerely,  Electronically signed by therapist: Kristine Ortiz, PT, DPT, CSCS  Physician's certification required: Yes  I certify the need for these services furnished under this plan of treatment and while under my care.    X___________________________________________________ Date____________________    Certification From: 11/1/2024  To:1/30/2025

## 2024-11-08 ENCOUNTER — OFFICE VISIT (OUTPATIENT)
Dept: PHYSICAL THERAPY | Age: 19
End: 2024-11-08
Attending: PHYSICAL MEDICINE & REHABILITATION
Payer: MEDICAID

## 2024-11-08 DIAGNOSIS — M25.511 CHRONIC RIGHT SHOULDER PAIN: Primary | ICD-10-CM

## 2024-11-08 DIAGNOSIS — G89.29 CHRONIC RIGHT SHOULDER PAIN: Primary | ICD-10-CM

## 2024-11-08 PROCEDURE — 97112 NEUROMUSCULAR REEDUCATION: CPT

## 2024-11-08 PROCEDURE — 97110 THERAPEUTIC EXERCISES: CPT

## 2024-11-08 NOTE — PROGRESS NOTES
Diagnosis:   Chronic right shoulder pain       Referring Provider: Stephane Kendrick  Date of Evaluation:    11/1/24    Precautions:  None Next MD visit:   none scheduled  Date of Surgery: n/a   Insurance Primary/Secondary: BL[employer]OSS MEDICAID / N/A     # Auth Visits: [id number]            Subjective: Patient reports having right shoulder soreness this am. He has been doing his HEP and felt ok after his initial eval.    Pain: 4/10      Objective:   Observation/Posture: Depressed right shoulder and right rounded shoulder posture.  Palpation: Right shoulder pain at the rtc insertion region.  Sensation: Intact tyler.  Cervical Screen: 50% decrease in all directions except ext. At 25% deficit.     AROM: (* denotes performed with pain)  Shoulder    Flexion: R 180; L 180  Abduction: R 180; L 180  ER: R 90; L 90  IR: R 69; L 90      Accessory motion: Right shoulder hypomobility in all directions; +2 grades.     Flexibility: Right shoulder tightness.     Strength/MMT: (* denotes performed with pain)  Shoulder Elbow Scapular   Flexion: R 4/5; L 4+/5  Abduction: R 4/5; L 4+/5  ER: R 4/5; L 4+/5  IR: R 4/5; L 4+/5 Flexion: R 4/5; L 4+/5  Extension: R 4/5; L 4+/5    Rhomboids: R4/5, L 4+/5  Mid trap: R 4/5; L 4+/5   Lats: R 4/5, L 4+/5  Low trap: R 4/5; L 4+/5      Special tests:   Positive right shoulder kennedy-chay test. Negative for all other tests.      Assessment: Patient presents with increased right shoulder pain during his exercises. The patient was going at a slow and guarded pace during his stretching and strengthening exercises. Provided verbal cues to decrease his pain levels.      Goals: (to be met in 10 visits)   .Pt will report improved ability to sleep without waking due to shoulder pain   Pt will increase shoulder AROM IR to scratch his back at the scapular level to be able to reach in back pocket, tuck in shirt, and turn steering wheel without pain  Pt will improve shoulder strength throughout to 5/5 to improve function  with working   Pt will demonstrate increased mid/low trap strength to 5/5 to promote improved shoulder mechanics and stabilization with lifting and reaching   Pt will be independent and compliant with comprehensive HEP to maintain progress achieved in PT        Plan: Plan to work on rom, strengthening and posture.  Date: 11/8/2024  TX#: 2/10 Date:                 TX#: 3/ Date:                 TX#: 4/ Date:                 TX#: 5/ Date:   Tx#: 6/   Ther. Ex.: 25'  UBE 4'/4' L1  Doorway Stretch 4x30\"  Cane IR St. 20x5\"  Scap. Ret. 20x3\"       Neuro. Re-ed.: 10'  Shoulder Shrugs 20x  Foam Roll Supine St. 3'  Foam Roll [place] 20xea,       Modalities:  Ice at the right shoulder for 10' unbilled after his treatment.       HEP: Reviewed his original HEP.    Charges: 2TE and 1Neuro       Total Timed Treatment: 35 min  Total Treatment Time: 45 min

## 2024-11-15 ENCOUNTER — OFFICE VISIT (OUTPATIENT)
Dept: PHYSICAL THERAPY | Age: 19
End: 2024-11-15
Attending: PHYSICAL MEDICINE & REHABILITATION
Payer: MEDICAID

## 2024-11-15 DIAGNOSIS — G89.29 CHRONIC RIGHT SHOULDER PAIN: Primary | ICD-10-CM

## 2024-11-15 DIAGNOSIS — M25.511 CHRONIC RIGHT SHOULDER PAIN: Primary | ICD-10-CM

## 2024-11-15 PROCEDURE — 97112 NEUROMUSCULAR REEDUCATION: CPT

## 2024-11-15 PROCEDURE — 97110 THERAPEUTIC EXERCISES: CPT

## 2024-11-15 NOTE — PROGRESS NOTES
Diagnosis:   Chronic right shoulder pain       Referring Provider: Stephane Kendrick  Date of Evaluation:    11/1/24    Precautions:  None Next MD visit:   none scheduled  Date of Surgery: n/a   Insurance Primary/Secondary: [employer]OSS MEDICAID / N/A     # Auth Visits: [id number]            Subjective: Patient reports having continued right shoulder this am. He has been doing his HEP and felt ok after his last visit.    Pain: 4/10      Objective:   Observation/Posture: Depressed right shoulder and right rounded shoulder posture.  Palpation: Right shoulder pain at the rtc insertion region.  Sensation: Intact tyler.  Cervical Screen: 50% decrease in all directions except ext. At 25% deficit.     AROM: (* denotes performed with pain)  Shoulder    Flexion: R 180; L 180  Abduction: R 180; L 180  ER: R 90; L 90  IR: R 69; L 90      Accessory motion: Right shoulder hypomobility in all directions; +2 grades.     Flexibility: Right shoulder tightness.     Strength/MMT: (* denotes performed with pain)  Shoulder Elbow Scapular   Flexion: R 4/5; L 4+/5  Abduction: R 4/5; L 4+/5  ER: R 4/5; L 4+/5  IR: R 4/5; L 4+/5 Flexion: R 4/5; L 4+/5  Extension: R 4/5; L 4+/5    Rhomboids: R4/5, L 4+/5  Mid trap: R 4/5; L 4+/5   Lats: R 4/5, L 4+/5  Low trap: R 4/5; L 4+/5      Special tests:   Positive right shoulder kennedy-chay test. Negative for all other tests.      Assessment: Patient presents with no right shoulder pain during his exercises. The patient was going at a slow and guarded pace during his self stretching and strengthening exercises. Provided verbal cues to perform his act. Correctly.      Goals: (to be met in 10 visits)   .Pt will report improved ability to sleep without waking due to shoulder pain   Pt will increase shoulder AROM IR to scratch his back at the scapular level to be able to reach in back pocket, tuck in shirt, and turn steering wheel without pain  Pt will improve shoulder strength throughout to 5/5 to improve function  with working   Pt will demonstrate increased mid/low trap strength to 5/5 to promote improved shoulder mechanics and stabilization with lifting and reaching   Pt will be independent and compliant with comprehensive HEP to maintain progress achieved in PT        Plan: Plan to work on rom, strengthening and posture.  Date: 11/8/2024  TX#: 2/10 Date:  11/15/24               TX#: 3/10 Date:                 TX#: 4/ Date:                 TX#: 5/ Date:   Tx#: 6/   Ther. Ex.: 25'  UBE 4'/4' L1  Doorway Stretch 4x30\"  Cane IR St. 20x5\"  Scap. Ret. 20x3\" Ther. Ex.: 25'  UBE 4'/4' L1  Doorway Stretch 4x30\"  Cane IR St. 20x5\"  GTB Scap. Ret. 2x15      Neuro. Re-ed.: 10'  Shoulder Shrugs 20x  Foam Roll Supine St. 3'  Foam Roll [place] 20xea. Neuro. Re-ed.: 20'  3# Shoulder Shrugs 20x  Foam Roll Supine St. 3'  Foam Roll [place] and [place] 20xea.  GTB St. Elbow Rows 2x15      Modalities:  Ice at the right shoulder for 10' unbilled after his treatment.       HEP: Did not add any new exercises to HEP.    Charges: 2TE and 1Neuro       Total Timed Treatment: 45 min  Total Treatment Time: 45 min

## 2024-11-18 ENCOUNTER — HOSPITAL ENCOUNTER (OUTPATIENT)
Dept: ULTRASOUND IMAGING | Facility: HOSPITAL | Age: 19
Discharge: HOME | End: 2024-11-18
Attending: PHYSICAL MEDICINE & REHABILITATION
Payer: MEDICAID

## 2024-11-18 DIAGNOSIS — G89.29 CHRONIC RIGHT SHOULDER PAIN: ICD-10-CM

## 2024-11-18 DIAGNOSIS — M25.511 CHRONIC RIGHT SHOULDER PAIN: ICD-10-CM

## 2024-11-18 PROCEDURE — 76881 US COMPL JOINT R-T W/IMG: CPT | Performed by: PHYSICAL MEDICINE & REHABILITATION

## 2024-11-22 ENCOUNTER — OFFICE VISIT (OUTPATIENT)
Dept: PHYSICAL THERAPY | Age: 19
End: 2024-11-22
Attending: PHYSICAL MEDICINE & REHABILITATION
Payer: MEDICAID

## 2024-11-22 DIAGNOSIS — M25.511 CHRONIC RIGHT SHOULDER PAIN: Primary | ICD-10-CM

## 2024-11-22 DIAGNOSIS — G89.29 CHRONIC RIGHT SHOULDER PAIN: Primary | ICD-10-CM

## 2024-11-22 PROCEDURE — 97110 THERAPEUTIC EXERCISES: CPT

## 2024-11-22 PROCEDURE — 97112 NEUROMUSCULAR REEDUCATION: CPT

## 2024-11-22 NOTE — PROGRESS NOTES
Diagnosis:   Chronic right shoulder pain       Referring Provider: Stephane Kendrick  Date of Evaluation:    11/1/24    Precautions:  None Next MD visit:   none scheduled  Date of Surgery: n/a   Insurance Primary/Secondary: BLUE [employer]OSS MEDICAID / N/A     # Auth Visits: 10            Subjective: Patient reports having continued right shoulder this am. He has been doing his HEP and felt ok after his last visit.    Pain: 5/10      Objective:   Observation/Posture: Depressed right shoulder and right rounded shoulder posture.  Palpation: Right shoulder pain at the rtc insertion region.  Sensation: Intact tyler.  Cervical Screen: 50% decrease in all directions except ext. At 25% deficit.     AROM: (* denotes performed with pain)  Shoulder    Flexion: R 180; L 180  Abduction: R 180; L 180  ER: R 90; L 90  IR: R 69; L 90      Accessory motion: Right shoulder hypomobility in all directions; +2 grades.     Flexibility: Right shoulder tightness.     Strength/MMT: (* denotes performed with pain)  Shoulder Elbow Scapular   Flexion: R 4/5; L 4+/5  Abduction: R 4/5; L 4+/5  ER: R 4/5; L 4+/5  IR: R 4/5; L 4+/5 Flexion: R 4/5; L 4+/5  Extension: R 4/5; L 4+/5    Rhomboids: R4/5, L 4+/5  Mid trap: R 4/5; L 4+/5   Lats: R 4/5, L 4+/5  Low trap: R 4/5; L 4+/5      Special tests:   Positive right shoulder kennedy-chay test. Negative for all other tests.      Assessment: Patient presents with no right shoulder pain during his exercises again. The patient was going at a slow and relaxed pace during his self stretching and strengthening exercises to avoid pain.       Goals: (to be met in 10 visits)   .Pt will report improved ability to sleep without waking due to shoulder pain   Pt will increase shoulder AROM IR to scratch his back at the scapular level to be able to reach in back pocket, tuck in shirt, and turn steering wheel without pain  Pt will improve shoulder strength throughout to 5/5 to improve function with working   Pt will  demonstrate increased mid/low trap strength to 5/5 to promote improved shoulder mechanics and stabilization with lifting and reaching   Pt will be independent and compliant with comprehensive HEP to maintain progress achieved in PT        Plan: Plan to work on rom, strengthening and posture.  Date: 11/8/2024  TX#: 2/10 Date:  11/15/24               TX#: 3/10 Date:  11/22/24               TX#: 4/10 Date:                 TX#: 5/ Date:   Tx#: 6/   Ther. Ex.: 25'  UBE 4'/4' L1  Doorway Stretch 4x30\"  Cane IR St. 20x5\"  Scap. Ret. 20x3\" Ther. Ex.: 25'  UBE 4'/4' L1  Doorway Stretch 4x30\"  Cane IR St. 20x5\"  GTB Scap. Ret. 2x15 Ther. Ex.: 25'  UBE 4'/4' L1  Doorway Stretch 4x30\"  Cane IR St. 20x5\"  GTB Scap. Ret. 2x15     Neuro. Re-ed.: 10'  Shoulder Shrugs 20x  Foam Roll Supine St. 3'  Foam Roll [place] 20xea. Neuro. Re-ed.: 20'  3# Shoulder Shrugs 20x  Foam Roll Supine St. 3'  Foam Roll [place] 20xea.  GTB St. Elbow Rows 2x15 Neuro. Re-ed.: 20'  3# Shoulder Shrugs 20x  Foam Roll Supine St. 3'  Foam Roll [place] and [place] 20xea.  GTB St. Elbow Rows 2x15     Modalities:  Ice at the right shoulder for 10' unbilled after his treatment.       HEP: Did not add any new exercises to HEP.    Charges: 2TE and 1Neuro       Total Timed Treatment: 45 min  Total Treatment Time: 45 min

## 2024-11-26 ENCOUNTER — APPOINTMENT (OUTPATIENT)
Dept: PHYSICAL THERAPY | Age: 19
End: 2024-11-26
Attending: PHYSICAL MEDICINE & REHABILITATION
Payer: MEDICAID

## 2024-12-06 ENCOUNTER — APPOINTMENT (OUTPATIENT)
Dept: PHYSICAL THERAPY | Age: 19
End: 2024-12-06
Attending: PHYSICAL MEDICINE & REHABILITATION
Payer: MEDICAID

## 2024-12-13 ENCOUNTER — APPOINTMENT (OUTPATIENT)
Dept: PHYSICAL THERAPY | Age: 19
End: 2024-12-13
Attending: PHYSICAL MEDICINE & REHABILITATION
Payer: MEDICAID

## 2024-12-20 ENCOUNTER — APPOINTMENT (OUTPATIENT)
Dept: PHYSICAL THERAPY | Age: 19
End: 2024-12-20
Attending: PHYSICAL MEDICINE & REHABILITATION
Payer: MEDICAID

## 2025-01-06 DIAGNOSIS — L70.0 ACNE VULGARIS: Primary | ICD-10-CM

## 2025-01-07 RX ORDER — ISOTRETINOIN 30 MG/1
60 CAPSULE ORAL DAILY
Qty: 60 CAPSULE | Refills: 0 | OUTPATIENT
Start: 2025-01-07

## 2025-01-07 NOTE — TELEPHONE ENCOUNTER
Refill Request for medication(s): Isotretinoin 30 MG Oral Cap     Last Office Visit: 09/09/24    Last Refill: 09/09/24 (not dispensed)    Pharmacy, Dosage verified: [id number] [place] [address] [phone], [phone]     Condition Update (if applicable): See [email or web address] message and Patient Comment: Hello , sorry for the late notice but i have decided i want to start on the isotretinoin if possible , i wont be using the other prescriptions prescribed and want to stay committed to these medications.     \"This patient has a status of “Inactive” in the iPLEDGE system. You will need to re-enroll this patient in iPLEDGE if they are to begin therapy.\"    Rx pended and sent to provider for approval, please advise. Thank You!

## 2025-01-17 ENCOUNTER — OFFICE VISIT (OUTPATIENT)
Dept: DERMATOLOGY CLINIC | Facility: CLINIC | Age: 20
End: 2025-01-17
Payer: MEDICAID

## 2025-01-17 DIAGNOSIS — L70.0 ACNE VULGARIS: Primary | ICD-10-CM

## 2025-01-17 PROCEDURE — 99213 OFFICE O/P EST LOW 20 MIN: CPT | Performed by: PHYSICIAN ASSISTANT

## 2025-01-17 RX ORDER — TRETINOIN 0.25 MG/G
1 CREAM TOPICAL NIGHTLY
Qty: 30 G | Refills: 3 | Status: SHIPPED | OUTPATIENT
Start: 2025-01-17

## 2025-01-17 RX ORDER — CLINDAMYCIN PHOSPHATE 10 UG/ML
1 LOTION TOPICAL DAILY
Qty: 60 ML | Refills: 3 | Status: SHIPPED | OUTPATIENT
Start: 2025-01-17

## 2025-01-17 NOTE — PATIENT INSTRUCTIONS
Morning Routine:    Cleanser  Clindamycin  Moisturizer  Sun screen      Evening Routine:    Cleanser  Moisturizer  Retinoid --> use a pea sized dot for the entire face 2-3 times per week. Can move slowly up to every night.   Moisturizer

## 2025-01-17 NOTE — PROGRESS NOTES
HPI:    Patient ID: Aba Robles is a 19 year old male.    Patient presents for follow-up on accutane. No draining or tenderness noted. No scaling noted. No erythema noted. Mostly on the cheeks and chin. Nothing on chest or back. No allergies to medications noted. Does have hx of schziophrenia. Denies hx of SI/HI. He is consistent with taking his medications. Currently using moisturizer and cleanser. Feels moisturizer has broken him out more. Not using clindamycin or tretinoin right now.         Review of Systems   Constitutional:  Negative for chills and fever.   Musculoskeletal:  Negative for arthralgias and myalgias.   Skin:  Positive for rash. Negative for color change and wound.            Current Outpatient Medications   Medication Sig Dispense Refill    clindamycin 1 % External Lotion Apply 1 Application topically daily. 60 mL 3    tretinoin 0.025 % External Cream Apply 1 Application topically nightly. Use as tolerated 30 g 3    Isotretinoin 30 MG Oral Cap Take 2 capsules (60 mg total) by mouth daily. 60 capsule 0    clindamycin 1 % External Lotion Apply 1 Application topically daily. 60 mL 3    Tretinoin 0.05 % External Cream Apply to face nightly as tolerated 20 g 5    OLANZapine 5 MG Oral Tab Take 1 tablet (5 mg total) by mouth nightly. (Patient not taking: Reported on 8/12/2024)       Allergies:Allergies[1]   There were no vitals taken for this visit.  There is no height or weight on file to calculate BMI.  PHYSICAL EXAM:   Physical Exam  Constitutional:       General: He is not in acute distress.     Appearance: Normal appearance.   Skin:     General: Skin is warm and dry.      Findings: Rash present.      Comments: Papules noted on the cheeks and chin. Spares forehead. No draining or tendenress noted. No scaling noted. No erythema noted.    Neurological:      Mental Status: He is alert and oriented to person, place, and time.                ASSESSMENT/PLAN:   1. Acne vulgaris  -After  discussion with patient, advised the following:  -Started Clindamycin   -Educated to apply daily in the morning.   -Started tretinoin as well  -Educated to apply 2-3 times per week at night only  -Can titrate up to nightly if not redness, irritation or dryness noted  -Shoulder moisturize daily  -Should cleanse daily and after sports.   -He will followup with dermatologist should he want to do the accutane.   -Advised that it would be up to dermatologist to decide if he can be on accutane again.   -To return in 3 months for follow-up to monitor progress and toleration.   -To call or follow-up with worsening symptoms or concerns.   -Pt was agreeable to plan and will comply with discussion above.         No orders of the defined types were placed in this encounter.      Meds This Visit:  Requested Prescriptions     Signed Prescriptions Disp Refills    clindamycin 1 % External Lotion 60 mL 3     Sig: Apply 1 Application topically daily.    tretinoin 0.025 % External Cream 30 g 3     Sig: Apply 1 Application topically nightly. Use as tolerated       Imaging & Referrals:  None         ID#[id number]         [1] No Known Allergies

## 2025-04-28 ENCOUNTER — OFFICE VISIT (OUTPATIENT)
Dept: DERMATOLOGY CLINIC | Facility: CLINIC | Age: 20
End: 2025-04-28

## 2025-04-28 ENCOUNTER — LAB ENCOUNTER (OUTPATIENT)
Dept: LAB | Age: 20
End: 2025-04-28
Attending: STUDENT IN AN ORGANIZED HEALTH CARE EDUCATION/TRAINING PROGRAM
Payer: MEDICAID

## 2025-04-28 DIAGNOSIS — Z51.81 MEDICATION MONITORING ENCOUNTER: ICD-10-CM

## 2025-04-28 DIAGNOSIS — L70.0 ACNE VULGARIS: Primary | ICD-10-CM

## 2025-04-28 DIAGNOSIS — L70.0 ACNE VULGARIS: ICD-10-CM

## 2025-04-28 LAB
ALT SERPL-CCNC: 15 U/L (ref 10–49)
AST SERPL-CCNC: 23 U/L (ref ?–34)
TRIGL SERPL-MCNC: 51 MG/DL (ref 30–149)

## 2025-04-28 PROCEDURE — 99214 OFFICE O/P EST MOD 30 MIN: CPT | Performed by: STUDENT IN AN ORGANIZED HEALTH CARE EDUCATION/TRAINING PROGRAM

## 2025-04-28 PROCEDURE — 36415 COLL VENOUS BLD VENIPUNCTURE: CPT

## 2025-04-28 PROCEDURE — 84460 ALANINE AMINO (ALT) (SGPT): CPT

## 2025-04-28 PROCEDURE — 84450 TRANSFERASE (AST) (SGOT): CPT

## 2025-04-28 PROCEDURE — 84478 ASSAY OF TRIGLYCERIDES: CPT

## 2025-04-28 NOTE — PROGRESS NOTES
Established patient     Referred by:   No referring provider defined for this encounter.     CHIEF COMPLAINT: Acne    HISTORY OF PRESENT ILLNESS: .    1. Acne  Location: face and back   Duration: over 1 year  Bleeding, growing, changing?: Yes: has not seen improvement.   Scaly?:No    Itchy?:No    Current treatment: clindamycin and tretinoin   Past treatments: none      DERM HISTORY:  History of skin cancer: No  History of chronic skin disease/condition: No    FAMILY HISTORY:  History of melanoma: No    History/Other:    REVIEW OF SYSTEMS:  Constitutional: Denies fever, chills, unintentional weight loss.   Skin as per HPI    PAST MEDICAL HISTORY:  Past Medical History[1]    Medications  Current Medications[2]    Objective:    PHYSICAL EXAM:  General: awake, alert, no acute distress  Skin: Skin exam was performed today including the following: face. Pertinent findings include:   - with numerous erythematous papules     ASSESSMENT & PLAN:  Pathophysiology of diagnoses discussed with patient.  Therapeutic options reviewed. Risks, benefits, and alternatives discussed with patient. Instructions reviewed at length.    #Acne Vulgaris, nodulocystic  - Given patient has failed topical medications and oral antibiotics, isotretinoin is recommended at this point. Reviewed alternative systemic treatment options. Discussed that without systemic therapy, risks include irreversible scarring of affected areas.  - Goal dose 200mg/kg = 83558 mg    - Discussed patient cannot donate blood while on the medication and for 1 month after stopping. Discussed that patient cannot share medication.  - Labs today  AST, ALT, fasting lipid panel. If labs within acceptable limits, script for isotretinoin to be sent to pharmacy and patient to be confirmed in iPledge.    I discussed at length the issues of isotretnoin therapy including goal of treatment, medication dosage, duration of therapy, and side effects, as well as the alternative to care.      Patient denies personal/family hx of IBD  Patient denies personal/family hx of depression/suicide    Reviewed adverse effects including those that are common and not serious, such as dry skin (xerosis) with/without retinoid dermatitis, dry lips (cheilitis), dry nose (xeromycteria) with/without epistaxis, dry eyes (xerophthalmia), irritation of contact lenses, dyslipidemia (increase in cholesterol and triglycerides), phototoxicity, possible exacerbation/flaring of acne vulgaris in the first 4 to 6 weeks of therapy, and arthralgias/myalgias (muscle aches and joint pains), as well as those that are rare, but serious, such as pseudotumor cerebri, major depressive disorder, suicidal ideation, hepatotoxicity, pancreatitis, teratogenicity (females), anemia/leukopenia, changes to night vision, and possible unmasking or exacerbation of inflammatory bowel disease.    The patient understands there is a possibility of acne recurrence; however, acne is often easier to treat if it recurs after isotretinoin therapy. Patient agrees to not give blood during treatment and for 1 month after treatment. Patient agrees not to share medication. If there are any adverse events/symptoms including depression, thoughts of suicide, diarrhea, abdominal pain or cramping, blood in stool, or other concerning side effects, patient will contact us or head directly to ED for immediate evaluation. Patient informed to stop all other acne medications while on isotretinoin. Informed patient they can take Ibuprofen or NSAIDs for joint aches. Instructed to avoid Tylenol and alcohol due to increased risk of liver toxicity while on isotretinoin. Questions were answered, Patient expressed understanding of the risks, benefits, alternatives and guidelines and would like to proceed with isotretinoin therapy. Counseling and consents signed today and iPledge booklet given to patient.    Return to clinic: 1 month       Ramiro Escobedo MD    By signing my name  below, I, Dustin AMOR MA,  attest that this documentation has been prepared under the direction and in the presence of Ramiro Escobedo MD.   Electronically Signed: Dustin AMOR MA, 4/28/2025, 2:03 PM.             [1] No past medical history on file.  [2]   Current Outpatient Medications   Medication Sig Dispense Refill    clindamycin 1 % External Lotion Apply 1 Application topically daily. 60 mL 3    tretinoin 0.025 % External Cream Apply 1 Application topically nightly. Use as tolerated 30 g 3    Isotretinoin 30 MG Oral Cap Take 2 capsules (60 mg total) by mouth daily. 60 capsule 0    clindamycin 1 % External Lotion Apply 1 Application topically daily. 60 mL 3    Tretinoin 0.05 % External Cream Apply to face nightly as tolerated 20 g 5    OLANZapine 5 MG Oral Tab Take 1 tablet (5 mg total) by mouth nightly. (Patient not taking: Reported on 8/12/2024)

## 2025-04-29 RX ORDER — ISOTRETINOIN 30 MG/1
60 CAPSULE ORAL DAILY
Qty: 60 CAPSULE | Refills: 0 | Status: SHIPPED | OUTPATIENT
Start: 2025-04-29

## 2025-04-29 RX ORDER — ISOTRETINOIN 30 MG/1
60 CAPSULE ORAL DAILY
Qty: 60 CAPSULE | Refills: 0 | Status: CANCELLED | OUTPATIENT
Start: 2025-04-29

## 2025-04-29 NOTE — TELEPHONE ENCOUNTER
Refill Request for medication(s):     Last Office Visit: 04/28/2025    Labs resulted yesterday, unremarkable       Please advise on RX, last accutane sent 9/9/24 for 60mg daily

## 2025-04-30 ENCOUNTER — PATIENT MESSAGE (OUTPATIENT)
Dept: DERMATOLOGY CLINIC | Facility: CLINIC | Age: 20
End: 2025-04-30

## 2025-05-02 NOTE — TELEPHONE ENCOUNTER
Approved on May 1 by [facility] 2017  The case has been Approved from [id number]  Effective Date: 1/31/2025  Authorization Expiration Date: 5/1/2026

## 2025-05-07 ENCOUNTER — MED REC SCAN ONLY (OUTPATIENT)
Dept: DERMATOLOGY CLINIC | Facility: CLINIC | Age: 20
End: 2025-05-07

## 2025-05-29 ENCOUNTER — LAB ENCOUNTER (OUTPATIENT)
Dept: LAB | Age: 20
End: 2025-05-29
Attending: STUDENT IN AN ORGANIZED HEALTH CARE EDUCATION/TRAINING PROGRAM
Payer: MEDICAID

## 2025-05-29 ENCOUNTER — OFFICE VISIT (OUTPATIENT)
Dept: DERMATOLOGY CLINIC | Facility: CLINIC | Age: 20
End: 2025-05-29

## 2025-05-29 DIAGNOSIS — Z51.81 MEDICATION MONITORING ENCOUNTER: ICD-10-CM

## 2025-05-29 DIAGNOSIS — L70.0 ACNE VULGARIS: Primary | ICD-10-CM

## 2025-05-29 DIAGNOSIS — L70.0 ACNE VULGARIS: ICD-10-CM

## 2025-05-29 LAB
ALT SERPL-CCNC: 12 U/L (ref 10–49)
AST SERPL-CCNC: 21 U/L (ref ?–34)
TRIGL SERPL-MCNC: 59 MG/DL (ref 30–149)

## 2025-05-29 PROCEDURE — 84450 TRANSFERASE (AST) (SGOT): CPT

## 2025-05-29 PROCEDURE — 84478 ASSAY OF TRIGLYCERIDES: CPT

## 2025-05-29 PROCEDURE — 36415 COLL VENOUS BLD VENIPUNCTURE: CPT

## 2025-05-29 PROCEDURE — 84460 ALANINE AMINO (ALT) (SGPT): CPT

## 2025-05-29 PROCEDURE — 99214 OFFICE O/P EST MOD 30 MIN: CPT | Performed by: STUDENT IN AN ORGANIZED HEALTH CARE EDUCATION/TRAINING PROGRAM

## 2025-05-29 RX ORDER — ISOTRETINOIN 40 MG/1
80 CAPSULE ORAL DAILY
Qty: 60 CAPSULE | Refills: 0 | Status: SHIPPED | OUTPATIENT
Start: 2025-05-29

## 2025-05-29 NOTE — PROGRESS NOTES
Established patient     Referred by:   No referring provider defined for this encounter.     CHIEF COMPLAINT: Acne F/U      HISTORY OF PRESENT ILLNESS: .    1. Acne  Location: face and upper back   Duration: over 1 year  Bleeding, growing, changing?: Pt had flare on upper back since last week.   Scaly?:No   Itchy?:No    Current treatment: Isotretinoin 60mg, face wash  Side effects: Dryness present on face/face. Pt denies SI/Anxiety/Depression  Past Treatment:clindamycin and tretinoin   Past treatments: none      DERM HISTORY:  History of skin cancer: No  History of chronic skin disease/condition: No    FAMILY HISTORY:  History of melanoma: No    History/Other:    REVIEW OF SYSTEMS:  Constitutional: Denies fever, chills, unintentional weight loss.   Skin as per HPI    PAST MEDICAL HISTORY:  Past Medical History[1]    Medications  Current Medications[2]    Objective:    PHYSICAL EXAM:  General: awake, alert, no acute distress  Skin: Skin exam was performed today including the following: face. Pertinent findings include:   - with numerous erythematous papules     ASSESSMENT & PLAN:  Pathophysiology of diagnoses discussed with patient.  Therapeutic options reviewed. Risks, benefits, and alternatives discussed with patient. Instructions reviewed at length.    #Acne Vulgaris, nodulocystic  - Goal dose 200mg/kg = 15294 mg    - Current cumulative dose dispensed is 1800 mg.  - Continue isotretinoin at 60 mg daily    #Medication monitoring encounter  #On Accutane therapy   - Labs today including AST/ALT, fasting lipid panel - patient to be confirmed in [employer] if labs within acceptable limits  - ALT/AST, fasting lipid panel dated  reviewed and within acceptable limits - patient to be confirmed in ipledge today   - Patient agrees not to donate blood or share medication while on medication and for 1 month after     #Cheilitis  - Recommend liberal use of Vaseline and/or Aquaphor to lips multiple times per day    #Xerosis  -  Reviewed principles of dry skin care  - Recommend frequent application of emollients/moisturizers with built-in sunscreen of SPF 30+    Return to clinic: 1 month       Ramiro Escobedo MD    By signing my name below, IDustin MA,  attest that this documentation has been prepared under the direction and in the presence of Ramiro Escobedo MD.   Electronically Signed: Dustin AMOR MA, 4/28/2025, 2:03 PM.    I, Ramiro Escobedo MD,  personally performed the services described in this documentation. All medical record entries made by the scribe were at my direction and in my presence.  I have reviewed the chart and agree that the record reflects my personal performance and is accurate and complete.  Ramiro Escobedo MD, 5/29/2025, 8:46 AM           [1] No past medical history on file.  [2]   Current Outpatient Medications   Medication Sig Dispense Refill    Isotretinoin 30 MG Oral Cap Take 2 capsules (60 mg total) by mouth daily. 60 capsule 0    clindamycin 1 % External Lotion Apply 1 Application topically daily. 60 mL 3    tretinoin 0.025 % External Cream Apply 1 Application topically nightly. Use as tolerated 30 g 3    Isotretinoin 30 MG Oral Cap Take 2 capsules (60 mg total) by mouth daily. (Patient not taking: Reported on 4/28/2025) 60 capsule 0    clindamycin 1 % External Lotion Apply 1 Application topically daily. 60 mL 3    Tretinoin 0.05 % External Cream Apply to face nightly as tolerated 20 g 5    OLANZapine 5 MG Oral Tab Take 1 tablet (5 mg total) by mouth nightly. (Patient not taking: Reported on 8/12/2024)

## 2025-07-02 ENCOUNTER — LAB ENCOUNTER (OUTPATIENT)
Dept: LAB | Age: 20
End: 2025-07-02
Attending: STUDENT IN AN ORGANIZED HEALTH CARE EDUCATION/TRAINING PROGRAM
Payer: MEDICAID

## 2025-07-02 ENCOUNTER — OFFICE VISIT (OUTPATIENT)
Dept: DERMATOLOGY CLINIC | Facility: CLINIC | Age: 20
End: 2025-07-02

## 2025-07-02 DIAGNOSIS — L70.0 ACNE VULGARIS: ICD-10-CM

## 2025-07-02 DIAGNOSIS — Z51.81 MEDICATION MONITORING ENCOUNTER: ICD-10-CM

## 2025-07-02 DIAGNOSIS — L70.0 ACNE VULGARIS: Primary | ICD-10-CM

## 2025-07-02 LAB
ALT SERPL-CCNC: 10 U/L (ref 10–49)
AST SERPL-CCNC: 25 U/L (ref ?–34)
TRIGL SERPL-MCNC: 68 MG/DL (ref 30–149)

## 2025-07-02 PROCEDURE — 99214 OFFICE O/P EST MOD 30 MIN: CPT | Performed by: STUDENT IN AN ORGANIZED HEALTH CARE EDUCATION/TRAINING PROGRAM

## 2025-07-02 PROCEDURE — 36415 COLL VENOUS BLD VENIPUNCTURE: CPT

## 2025-07-02 PROCEDURE — 84450 TRANSFERASE (AST) (SGOT): CPT

## 2025-07-02 PROCEDURE — 84478 ASSAY OF TRIGLYCERIDES: CPT

## 2025-07-02 PROCEDURE — 84460 ALANINE AMINO (ALT) (SGPT): CPT

## 2025-07-02 RX ORDER — ISOTRETINOIN 40 MG/1
80 CAPSULE ORAL DAILY
Qty: 60 CAPSULE | Refills: 0 | Status: SHIPPED | OUTPATIENT
Start: 2025-07-02

## 2025-07-02 NOTE — PROGRESS NOTES
Pt confirmed in iPledge.  Patient can obtain their prescription from:  July 02, 2025 - July 31, 2025 (30 - Day Prescription window)

## 2025-07-02 NOTE — PROGRESS NOTES
Established patient     CHIEF COMPLAINT: Acne F/U      HISTORY OF PRESENT ILLNESS: .    1. Acne  Location: face and upper back   Duration: over 1 year  Current treatment: Isotretinoin 60mg, face wash  Side effects: Dryness present on face/face. Pt denies SI/Anxiety/Depression  Past Treatment:clindamycin and tretinoin   Past treatments: none      DERM HISTORY:  History of skin cancer: No  History of chronic skin disease/condition: No    FAMILY HISTORY:  History of melanoma: No    History/Other:    REVIEW OF SYSTEMS:  Constitutional: Denies fever, chills, unintentional weight loss.   Skin as per HPI    PAST MEDICAL HISTORY:  Past Medical History[1]    Medications  Current Medications[2]    Objective:    PHYSICAL EXAM:  General: awake, alert, no acute distress  Skin: Skin exam was performed today including the following: face. Pertinent findings include:   - with numerous erythematous papules     ASSESSMENT & PLAN:  Pathophysiology of diagnoses discussed with patient.  Therapeutic options reviewed. Risks, benefits, and alternatives discussed with patient. Instructions reviewed at length.    #Acne Vulgaris, nodulocystic  - Goal dose 200mg/kg = 27207 mg    - Current cumulative dose dispensed is 4200 mg.  - Continue isotretinoin at 80 mg daily    #Medication monitoring encounter  #On Accutane therapy   - Labs today including AST/ALT, fasting lipid panel - patient to be confirmed in [place] if labs within acceptable limits  - ALT/AST, fasting lipid panel dated  reviewed and within acceptable limits - patient to be confirmed in [facility] today   - Patient agrees not to donate blood or share medication while on medication and for 1 month after     #Cheilitis  - Recommend liberal use of Vaseline and/or Aquaphor to lips multiple times per day    #Xerosis  - Reviewed principles of dry skin care  - Recommend frequent application of emollients/moisturizers with built-in sunscreen of SPF 30+    Return to clinic: 1 month       Ramiro  MD Fanny    By signing my name below, I, Dustin AMOR MA,  attest that this documentation has been prepared under the direction and in the presence of Ramiro Escobedo MD.   Electronically Signed: Dustin AMOR MA, 7/2/2025, 9:02 AM.    I, Ramiro Escobedo MD,  personally performed the services described in this documentation. All medical record entries made by the scribe were at my direction and in my presence.  I have reviewed the chart and agree that the record reflects my personal performance and is accurate and complete.  Ramiro Escobedo MD, 7/2/2025, 11:47 AM               [1] No past medical history on file.  [2]   Current Outpatient Medications   Medication Sig Dispense Refill    Isotretinoin 40 MG Oral Cap Take 2 capsules (80 mg total) by mouth daily. 60 capsule 0    Isotretinoin 30 MG Oral Cap Take 2 capsules (60 mg total) by mouth daily. 60 capsule 0    clindamycin 1 % External Lotion Apply 1 Application topically daily. (Patient not taking: Reported on 5/29/2025) 60 mL 3    tretinoin 0.025 % External Cream Apply 1 Application topically nightly. Use as tolerated (Patient not taking: Reported on 5/29/2025) 30 g 3    Isotretinoin 30 MG Oral Cap Take 2 capsules (60 mg total) by mouth daily. (Patient not taking: Reported on 4/28/2025) 60 capsule 0    clindamycin 1 % External Lotion Apply 1 Application topically daily. (Patient not taking: Reported on 5/29/2025) 60 mL 3    Tretinoin 0.05 % External Cream Apply to face nightly as tolerated 20 g 5    OLANZapine 5 MG Oral Tab Take 1 tablet (5 mg total) by mouth nightly. (Patient not taking: Reported on 8/12/2024)

## (undated) NOTE — ED AVS SNAPSHOT
[facility] in [facility] [address]  [address] \[facility]\""    Phone:  [phone]    Fax:  [phone]           Ruthie García   MRN: [id number]    Department:  [facility] in [address] [place] [address]   Date of Visit: aspect of your visit today. In an effort to constantly improve our service to you, we would appreciate any positive or negative feedback related to the care you received in our Immediate Care. Please call our [address] at [phone].   Your Immediate contact you. Please make sure we have your correct phone number on file.      OUR CURRENT HOURS OF OPERATION:  [facility]  WEEKENDS AND HOLIDAYS 8AM - 6PM    I certified that I have received a copy of the aftercare instructions; that t Likeeds access allows you to view health information for your child from their recent   visit, view other health information and more. To sign up or find more information on getting   Proxy Access to your child’s Xentionhart go to [email or web address]

## (undated) NOTE — LETTER
[address]  [address] \[facility]\""  Dept: [phone]  Dept Fax: [phone]  Loc: [phone]      April 21, 2017    Patient: Laura Weber   Date of Visit: 4/21/2017       To Whom It May Concern:    Brand

## (undated) NOTE — ED AVS SNAPSHOT
Kaitlin Schulz   MRN: [id number]    Department:  [facility] Emergency Department   Date of Visit:  10/24/2019           Disclosure     Insurance plans vary and the physician(s) referred by the ER may not be covered by your plan.  Please contact CARE PHYSICIAN AT ONCE OR RETURN IMMEDIATELY TO THE EMERGENCY DEPARTMENT. If you have been prescribed any medication(s), please fill your prescription right away and begin taking the medication(s) as directed.   If you believe that any of the medications

## (undated) NOTE — LETTER
[facility] [address], [facility]   Date:   2/7/2023     Name:   Jose Winter    YOB: 2005   MRN:   [id number]       [facility]? Abdoulaye the areas on your body where you feel the described sensations. Use the appropriate symbol. Larance Saltness the areas of radiation. Include all affected areas. Just to complete the picture, please draw in the face. ACHE:  ^ ^ ^   NUMBNESS:  0000   PINS & NEEDLES:  = = = =                              ^ ^ ^                       0000              = = = =                                    ^ ^ ^                       0000            = = = =      BURNING:  XXXX   STABBING: ////                  XXXX                ////                         XXXX          ////     Please abdoulaye the line below indicating your degree of pain right now  with 0 being no pain 10 being the worst pain possible.                                          0             1             2              3             4              5              6              7             8             9             10         Patient Signature:

## (undated) NOTE — LETTER
[facility] [address] , [place]   Date:   3/21/2023     Name:   Lexi Azevedo    YOB: 2005   MRN:   [id number]       [facility]? Abdoulaye the areas on your body where you feel the described sensations. Use the appropriate symbol. Arley Monterroso the areas of radiation. Include all affected areas. Just to complete the picture, please draw in the face. ACHE:  ^ ^ ^   NUMBNESS:  0000   PINS & NEEDLES:  = = = =                              ^ ^ ^                       0000              = = = =                                    ^ ^ ^                       0000            = = = =      BURNING:  XXXX   STABBING: ////                  XXXX                ////                         XXXX          ////     Please abdoulaye the line below indicating your degree of pain right now  with 0 being no pain 10 being the worst pain possible.                                          0             1             2              3             4              5              6              7             8             9             10         Patient Signature: